# Patient Record
Sex: MALE | Race: BLACK OR AFRICAN AMERICAN | NOT HISPANIC OR LATINO | URBAN - METROPOLITAN AREA
[De-identification: names, ages, dates, MRNs, and addresses within clinical notes are randomized per-mention and may not be internally consistent; named-entity substitution may affect disease eponyms.]

---

## 2022-09-06 ENCOUNTER — INPATIENT (INPATIENT)
Facility: HOSPITAL | Age: 87
LOS: 10 days | Discharge: NURSING, REHAB OR LONG-TERM CARE | End: 2022-09-17
Attending: GENERAL ACUTE CARE HOSPITAL | Admitting: GENERAL ACUTE CARE HOSPITAL

## 2022-09-06 VITALS
SYSTOLIC BLOOD PRESSURE: 106 MMHG | HEART RATE: 79 BPM | HEIGHT: 71 IN | WEIGHT: 160.06 LBS | DIASTOLIC BLOOD PRESSURE: 75 MMHG | RESPIRATION RATE: 16 BRPM | TEMPERATURE: 98 F | OXYGEN SATURATION: 96 %

## 2022-09-06 DIAGNOSIS — N40.0 BENIGN PROSTATIC HYPERPLASIA WITHOUT LOWER URINARY TRACT SYMPTOMS: ICD-10-CM

## 2022-09-06 DIAGNOSIS — I10 ESSENTIAL (PRIMARY) HYPERTENSION: ICD-10-CM

## 2022-09-06 DIAGNOSIS — G30.9 ALZHEIMER'S DISEASE, UNSPECIFIED: ICD-10-CM

## 2022-09-06 DIAGNOSIS — E11.9 TYPE 2 DIABETES MELLITUS WITHOUT COMPLICATIONS: ICD-10-CM

## 2022-09-06 DIAGNOSIS — E78.5 HYPERLIPIDEMIA, UNSPECIFIED: ICD-10-CM

## 2022-09-06 DIAGNOSIS — Z29.9 ENCOUNTER FOR PROPHYLACTIC MEASURES, UNSPECIFIED: ICD-10-CM

## 2022-09-06 DIAGNOSIS — U07.1 COVID-19: ICD-10-CM

## 2022-09-06 DIAGNOSIS — N39.0 URINARY TRACT INFECTION, SITE NOT SPECIFIED: ICD-10-CM

## 2022-09-06 LAB
ALBUMIN SERPL ELPH-MCNC: 2.9 G/DL — LOW (ref 3.3–5)
ALP SERPL-CCNC: 63 U/L — SIGNIFICANT CHANGE UP (ref 40–120)
ALT FLD-CCNC: 32 U/L — SIGNIFICANT CHANGE UP (ref 12–78)
ANION GAP SERPL CALC-SCNC: 5 MMOL/L — SIGNIFICANT CHANGE UP (ref 5–17)
APPEARANCE UR: CLEAR — SIGNIFICANT CHANGE UP
AST SERPL-CCNC: 37 U/L — SIGNIFICANT CHANGE UP (ref 15–37)
BACTERIA # UR AUTO: ABNORMAL
BASOPHILS # BLD AUTO: 0.02 K/UL — SIGNIFICANT CHANGE UP (ref 0–0.2)
BASOPHILS NFR BLD AUTO: 0.4 % — SIGNIFICANT CHANGE UP (ref 0–2)
BILIRUB SERPL-MCNC: 0.3 MG/DL — SIGNIFICANT CHANGE UP (ref 0.2–1.2)
BILIRUB UR-MCNC: NEGATIVE — SIGNIFICANT CHANGE UP
BUN SERPL-MCNC: 27 MG/DL — HIGH (ref 7–23)
CALCIUM SERPL-MCNC: 9.4 MG/DL — SIGNIFICANT CHANGE UP (ref 8.5–10.1)
CHLORIDE SERPL-SCNC: 105 MMOL/L — SIGNIFICANT CHANGE UP (ref 96–108)
CO2 SERPL-SCNC: 28 MMOL/L — SIGNIFICANT CHANGE UP (ref 22–31)
COLOR SPEC: YELLOW — SIGNIFICANT CHANGE UP
CREAT SERPL-MCNC: 1.5 MG/DL — HIGH (ref 0.5–1.3)
DIFF PNL FLD: ABNORMAL
EGFR: 45 ML/MIN/1.73M2 — LOW
EOSINOPHIL # BLD AUTO: 0.03 K/UL — SIGNIFICANT CHANGE UP (ref 0–0.5)
EOSINOPHIL NFR BLD AUTO: 0.5 % — SIGNIFICANT CHANGE UP (ref 0–6)
EPI CELLS # UR: SIGNIFICANT CHANGE UP
GLUCOSE SERPL-MCNC: 131 MG/DL — HIGH (ref 70–99)
GLUCOSE UR QL: NEGATIVE MG/DL — SIGNIFICANT CHANGE UP
HCT VFR BLD CALC: 41.5 % — SIGNIFICANT CHANGE UP (ref 39–50)
HGB BLD-MCNC: 12.6 G/DL — LOW (ref 13–17)
IMM GRANULOCYTES NFR BLD AUTO: 0.4 % — SIGNIFICANT CHANGE UP (ref 0–1.5)
KETONES UR-MCNC: NEGATIVE — SIGNIFICANT CHANGE UP
LEUKOCYTE ESTERASE UR-ACNC: ABNORMAL
LYMPHOCYTES # BLD AUTO: 1.66 K/UL — SIGNIFICANT CHANGE UP (ref 1–3.3)
LYMPHOCYTES # BLD AUTO: 29.2 % — SIGNIFICANT CHANGE UP (ref 13–44)
MCHC RBC-ENTMCNC: 25.3 PG — LOW (ref 27–34)
MCHC RBC-ENTMCNC: 30.4 G/DL — LOW (ref 32–36)
MCV RBC AUTO: 83.2 FL — SIGNIFICANT CHANGE UP (ref 80–100)
MONOCYTES # BLD AUTO: 0.49 K/UL — SIGNIFICANT CHANGE UP (ref 0–0.9)
MONOCYTES NFR BLD AUTO: 8.6 % — SIGNIFICANT CHANGE UP (ref 2–14)
NEUTROPHILS # BLD AUTO: 3.47 K/UL — SIGNIFICANT CHANGE UP (ref 1.8–7.4)
NEUTROPHILS NFR BLD AUTO: 60.9 % — SIGNIFICANT CHANGE UP (ref 43–77)
NITRITE UR-MCNC: NEGATIVE — SIGNIFICANT CHANGE UP
NRBC # BLD: 0 /100 WBCS — SIGNIFICANT CHANGE UP (ref 0–0)
PH UR: 5 — SIGNIFICANT CHANGE UP (ref 5–8)
PLATELET # BLD AUTO: 240 K/UL — SIGNIFICANT CHANGE UP (ref 150–400)
POTASSIUM SERPL-MCNC: 5 MMOL/L — SIGNIFICANT CHANGE UP (ref 3.5–5.3)
POTASSIUM SERPL-SCNC: 5 MMOL/L — SIGNIFICANT CHANGE UP (ref 3.5–5.3)
PROT SERPL-MCNC: 8.6 GM/DL — HIGH (ref 6–8.3)
PROT UR-MCNC: 30 MG/DL
RAPID RVP RESULT: DETECTED
RBC # BLD: 4.99 M/UL — SIGNIFICANT CHANGE UP (ref 4.2–5.8)
RBC # FLD: 13.9 % — SIGNIFICANT CHANGE UP (ref 10.3–14.5)
RBC CASTS # UR COMP ASSIST: SIGNIFICANT CHANGE UP /HPF (ref 0–4)
SARS-COV-2 RNA SPEC QL NAA+PROBE: DETECTED
SODIUM SERPL-SCNC: 138 MMOL/L — SIGNIFICANT CHANGE UP (ref 135–145)
SP GR SPEC: 1.02 — SIGNIFICANT CHANGE UP (ref 1.01–1.02)
UROBILINOGEN FLD QL: NEGATIVE MG/DL — SIGNIFICANT CHANGE UP
WBC # BLD: 5.69 K/UL — SIGNIFICANT CHANGE UP (ref 3.8–10.5)
WBC # FLD AUTO: 5.69 K/UL — SIGNIFICANT CHANGE UP (ref 3.8–10.5)
WBC UR QL: ABNORMAL

## 2022-09-06 PROCEDURE — 70450 CT HEAD/BRAIN W/O DYE: CPT | Mod: 26,MA

## 2022-09-06 PROCEDURE — 99222 1ST HOSP IP/OBS MODERATE 55: CPT

## 2022-09-06 PROCEDURE — 99285 EMERGENCY DEPT VISIT HI MDM: CPT

## 2022-09-06 PROCEDURE — 71045 X-RAY EXAM CHEST 1 VIEW: CPT | Mod: 26

## 2022-09-06 PROCEDURE — 71250 CT THORAX DX C-: CPT | Mod: 26,MA

## 2022-09-06 RX ORDER — ACETAMINOPHEN 500 MG
650 TABLET ORAL EVERY 6 HOURS
Refills: 0 | Status: DISCONTINUED | OUTPATIENT
Start: 2022-09-06 | End: 2022-09-17

## 2022-09-06 RX ORDER — SODIUM CHLORIDE 9 MG/ML
1000 INJECTION, SOLUTION INTRAVENOUS
Refills: 0 | Status: DISCONTINUED | OUTPATIENT
Start: 2022-09-06 | End: 2022-09-17

## 2022-09-06 RX ORDER — TAMSULOSIN HYDROCHLORIDE 0.4 MG/1
0.4 CAPSULE ORAL AT BEDTIME
Refills: 0 | Status: DISCONTINUED | OUTPATIENT
Start: 2022-09-06 | End: 2022-09-17

## 2022-09-06 RX ORDER — DEXTROSE 50 % IN WATER 50 %
12.5 SYRINGE (ML) INTRAVENOUS ONCE
Refills: 0 | Status: DISCONTINUED | OUTPATIENT
Start: 2022-09-06 | End: 2022-09-17

## 2022-09-06 RX ORDER — DEXTROSE 50 % IN WATER 50 %
25 SYRINGE (ML) INTRAVENOUS ONCE
Refills: 0 | Status: DISCONTINUED | OUTPATIENT
Start: 2022-09-06 | End: 2022-09-17

## 2022-09-06 RX ORDER — SIMVASTATIN 20 MG/1
20 TABLET, FILM COATED ORAL AT BEDTIME
Refills: 0 | Status: DISCONTINUED | OUTPATIENT
Start: 2022-09-06 | End: 2022-09-17

## 2022-09-06 RX ORDER — INSULIN LISPRO 100/ML
VIAL (ML) SUBCUTANEOUS
Refills: 0 | Status: DISCONTINUED | OUTPATIENT
Start: 2022-09-06 | End: 2022-09-14

## 2022-09-06 RX ORDER — CEFTRIAXONE 500 MG/1
1000 INJECTION, POWDER, FOR SOLUTION INTRAMUSCULAR; INTRAVENOUS EVERY 24 HOURS
Refills: 0 | Status: DISCONTINUED | OUTPATIENT
Start: 2022-09-07 | End: 2022-09-09

## 2022-09-06 RX ORDER — CEFTRIAXONE 500 MG/1
1000 INJECTION, POWDER, FOR SOLUTION INTRAMUSCULAR; INTRAVENOUS ONCE
Refills: 0 | Status: COMPLETED | OUTPATIENT
Start: 2022-09-06 | End: 2022-09-06

## 2022-09-06 RX ORDER — DEXTROSE 50 % IN WATER 50 %
15 SYRINGE (ML) INTRAVENOUS ONCE
Refills: 0 | Status: DISCONTINUED | OUTPATIENT
Start: 2022-09-06 | End: 2022-09-17

## 2022-09-06 RX ORDER — DONEPEZIL HYDROCHLORIDE 10 MG/1
10 TABLET, FILM COATED ORAL AT BEDTIME
Refills: 0 | Status: DISCONTINUED | OUTPATIENT
Start: 2022-09-06 | End: 2022-09-17

## 2022-09-06 RX ORDER — GLUCAGON INJECTION, SOLUTION 0.5 MG/.1ML
1 INJECTION, SOLUTION SUBCUTANEOUS ONCE
Refills: 0 | Status: DISCONTINUED | OUTPATIENT
Start: 2022-09-06 | End: 2022-09-17

## 2022-09-06 RX ORDER — MEMANTINE HYDROCHLORIDE 10 MG/1
10 TABLET ORAL DAILY
Refills: 0 | Status: DISCONTINUED | OUTPATIENT
Start: 2022-09-06 | End: 2022-09-17

## 2022-09-06 RX ORDER — AMLODIPINE BESYLATE 2.5 MG/1
5 TABLET ORAL DAILY
Refills: 0 | Status: DISCONTINUED | OUTPATIENT
Start: 2022-09-06 | End: 2022-09-14

## 2022-09-06 NOTE — H&P ADULT - ASSESSMENT
Patient is an 87M with a PMH of dementia, HTN, DM, HLD, BPH who presents to the ED for AMS.  Patient currently AAOx0, unable to provide history.  Spoke to wife - Leon Rose - over the phone.  Per wife patient has had cough for the last 7 days, noted to have fever over the last two days.  At baseline, patient is confused and unable to communicate however is ambulatory with a walker.  For the last 4 days, patient has been unable to ambulate.  Wife concerned because patient's HHA is sick as well and she cannot take care of him at home alone.  Patient has received 3 COVID vaccinations (last in 12/21.)  Vitals stable, labs benign.  Will admit to med surg.

## 2022-09-06 NOTE — H&P ADULT - NSHPLABSRESULTS_GEN_ALL_CORE
Recent Vitals  T(C): 37 (22 @ 17:38), Max: 37 (22 @ 17:38)  HR: 66 (22 @ 17:38) (66 - 79)  BP: 119/66 (22 @ 17:38) (106/75 - 119/66)  RR: 19 (22 @ 17:38) (16 - 19)  SpO2: 96% (22 @ 17:38) (96% - 96%)                        12.6   5.69  )-----------( 240      ( 06 Sep 2022 18:00 )             41.5         138  |  105  |  27<H>  ----------------------------<  131<H>  5.0   |  28  |  1.50<H>    Ca    9.4      06 Sep 2022 18:00    TPro  8.6<H>  /  Alb  2.9<L>  /  TBili  0.3  /  DBili  x   /  AST  37  /  ALT  32  /  AlkPhos  63  09-      LIVER FUNCTIONS - ( 06 Sep 2022 18:00 )  Alb: 2.9 g/dL / Pro: 8.6 gm/dL / ALK PHOS: 63 U/L / ALT: 32 U/L / AST: 37 U/L / GGT: x           Urinalysis Basic - ( 06 Sep 2022 20:15 )    Color: Yellow / Appearance: Clear / S.020 / pH: x  Gluc: x / Ketone: Negative  / Bili: Negative / Urobili: Negative mg/dL   Blood: x / Protein: 30 mg/dL / Nitrite: Negative   Leuk Esterase: Trace / RBC: 0-2 /HPF / WBC 26-50   Sq Epi: x / Non Sq Epi: Few / Bacteria: Few        Home Medications:

## 2022-09-06 NOTE — ED ADULT NURSE REASSESSMENT NOTE - NS ED NURSE REASSESS COMMENT FT1
Received pt from previous RN in bed awake, not alert at baseline, spouse at bedside, pt noted to be saying unrelated words, cannot respond to question, noted to have his eyes closed and will not look at RN when asked to open eyes. when wife came, she admit that pt is incontinent x2, unable to verbalize needs. pt was straight cath for urine.

## 2022-09-06 NOTE — H&P ADULT - HISTORY OF PRESENT ILLNESS
Patient is an 87M with a PMH of dementia, HTN, DM, HLD, BPH who presents to the ED for AMS.  Patient currently AAOx0, unable to provide history.  Spoke to wife - Leon Rose - over the phone.  Per wife patient has had cough for the last 7 days, noted to have fever over the last two days.  At baseline, patient is confused and unable to communicate however is ambulatory with a walker.  For the last 4 days, patient has been unable to ambulate.  Wife concerned because patient's HHA is sick as well and she cannot take care of him at home alone.  Patient has received 3 COVID vaccinations (last in 12/21.)  Vitals stable, labs benign.  Will admit to med surg.        Wife - [phone]

## 2022-09-06 NOTE — H&P ADULT - NSHPPHYSICALEXAM_GEN_ALL_CORE
Physical exam:  General: elderly male in no acute distress, resting comfortably  Head:  Atraumatic, Normocephalic  Eyes: EOMI, PERRLA, clear sclera  Neck: Supple, thyroid nontender, non enlarged  Cardio: S1/S2 +ve, regular rate and rhythm, no M/G/R  Resp: clear to ausculation bilaterally, no rales or wheezes  GI: abdomen soft, nontender, non distended, no guarding, BS +ve x 4  Ext: no significant pedal edema  Neuro: CN 2-12 intact, no significant motor or sensory deficits.  Skin: No rashes or lesions

## 2022-09-06 NOTE — ED ADULT NURSE NOTE - OBJECTIVE STATEMENT
pt is 88 y/o male BIBA for weakness and cough as per spouse, BIBA. pts spouse providing history. pt nonverbal AAOX0 at baseline. pt spouse Leon reports pt non-ambulatory x4 days. hx of dementia, Alzheimers, HTN, borderline diabetes, HDL.  fall with harm precautions maintained.

## 2022-09-06 NOTE — ED PROVIDER NOTE - PHYSICAL EXAMINATION
Gen: +Lethargic. Responsive to pain and verbal stimuli. NAD. Afebrile.   Head: NC, AT   Eyes: PERRL, EOMI, normal lids/conjunctiva  ENT: normal hearing, patent oropharynx without erythema/exudate, uvula midline  Neck: supple, no tenderness, Trachea midline  Pulm: +Bronchi throughout b/l  CV: RRR, no M/R/G, 2+ radial and dp pulses bl, no edema  Abd: soft, NT/ND, +BS, no hepatosplenomegaly  Mskel: extremities x4 with normal ROM and no joint effusions. no ctl spine ttp. No peripheral edema.  Skin: no rash, no bruising   Neuro: +unable to complete neuro exam due to pt mental status but wife states pt is non verbal at baseline. Gen: +Lethargic. Responsive to pain and verbal stimuli. NAD. Afebrile.   Head: NC, AT   Eyes: PERRL, EOMI, normal lids/conjunctiva  ENT: normal hearing, patent oropharynx without erythema/exudate, uvula midline  Neck: supple, no tenderness, Trachea midline  Pulm: normal resp effort. rhonchi bilateral   CV: RRR, no M/R/G, 2+ radial and dp pulses bl, no edema  Abd: soft, NT/ND, +BS,   Mskel: extremities x4 with normal ROM and no joint effusions. No peripheral edema.  Skin: no rash, no bruising   Neuro:  responsive to stimuli, unable to complete exam due to mental status

## 2022-09-06 NOTE — H&P ADULT - NSHPREVIEWOFSYSTEMS_GEN_ALL_CORE
Constitutional: fever positive.  No chills, night sweats  Ears: no hearing changes or ear pain,   Nose: no nasal congestion, sinus pain, or rhinorrhea  Cardio: no chest pain, orthopnea, edema, or palpitations  Resp: cough positive.  No dyspnea, wheezing  GI: no nausea, vomiting, diarrhea, constipation, hematochezia, or melena  : no dysuria, urinary frequency, hematuria  MSK: no back pain, neck pain  Skin: no rash, pruritis   Neuro: weakness positive.  No dizziness, lightheadedness, syncope   Heme/Lymph: no bruising or bleeding

## 2022-09-06 NOTE — H&P ADULT - PROBLEM SELECTOR PLAN 1
COVID 19 swabbed in ED - +ve results.  Isolation precautions  Tylenol PRN fever  Will hold dexamethasone and remdesivir as patient is not requiring supplemental oxygen

## 2022-09-06 NOTE — ED PROVIDER NOTE - OBJECTIVE STATEMENT
87 year old male w/PMH of Dementia, HTN, HLD, presents to the ED c/o cough for the past x4 days w/ generalized weakness. As per wife, home attendant was sick last week and the pt caught a cold from them. Wife reports pt's cough is worse at night. No SOB. Wife states over the weekend, pt had a fever but has not been febrile since. Pt came in to the ED due to his inability to ambulate. Pt is nonverbal at baseline, more sleepy. Normal appetite. Wife states pt has home care during the day but the wife isn't able to take care of him at night.

## 2022-09-06 NOTE — ED ADULT NURSE NOTE - NSICDXPASTMEDICALHX_GEN_ALL_CORE_FT
PAST MEDICAL HISTORY:  Alzheimers disease     Dementia     Diabetes     High cholesterol     Hypertension

## 2022-09-06 NOTE — ED PROVIDER NOTE - ATTENDING APP SHARED VISIT CONTRIBUTION OF CARE
HPI:  87 year old male w/PMH of Dementia, HTN, HLD, presents to the ED c/o cough for the past x4 days w/ generalized weakness. As per wife, home attendant was sick last week and the pt caught a cold from them. Wife reports pt's cough is worse at night. No SOB. Wife states over the weekend, pt had a fever but has not been febrile since. Pt came in to the ED due to his inability to ambulate. Pt is nonverbal at baseline, more sleepy. Normal appetite. Wife states pt has home care during the day but the wife isn't able to take care of him at night.    ROS  neg  as per HPI    PE:  GENERAL: tired,  rouses to stimuli  HEENT: NC/AT, moist mucous membranes  LUNGS: CTAB, no wheezes or crackles   CARDIAC: RRR, no m/r/g  ABDOMEN: soft  non tender, non distended, no rebound, no guarding  EXT: No edema, no calf tenderness, 2+ DP pulses bilaterally, no deformities.  NEURO: Minimally interactive. Rouses  to voice. Moving all extremities.  SKIN: Warm and dry. No rash.  PSYCH: appears  at baseline    MDM:  87 year old male w/PMH of Dementia, HTN, HLD, presents to the ED c/o cough for the past x4 days w/ generalized weakness. Not  septic  but  likely infectious source  covid  vs pna attributing to sx.  Plan for labs, cxr, admission.

## 2022-09-06 NOTE — ED ADULT NURSE NOTE - CHIEF COMPLAINT QUOTE
BIBA as per EMS patient having general malaise and cough x 1 week. unable to ambulate x 4 days. Pt alert, disoriented, baseline per family member.   Hx: Alzheimer, Dementia, HTN, DM, HL

## 2022-09-06 NOTE — ED PROVIDER NOTE - NS ED ATTENDING STATEMENT MOD
This was a shared visit with the THAIS. I reviewed and verified the documentation and independently performed the documented:

## 2022-09-07 LAB
A1C WITH ESTIMATED AVERAGE GLUCOSE RESULT: 6.7 % — HIGH (ref 4–5.6)
ANION GAP SERPL CALC-SCNC: 6 MMOL/L — SIGNIFICANT CHANGE UP (ref 5–17)
BUN SERPL-MCNC: 26 MG/DL — HIGH (ref 7–23)
CALCIUM SERPL-MCNC: 9.2 MG/DL — SIGNIFICANT CHANGE UP (ref 8.5–10.1)
CHLORIDE SERPL-SCNC: 106 MMOL/L — SIGNIFICANT CHANGE UP (ref 96–108)
CO2 SERPL-SCNC: 26 MMOL/L — SIGNIFICANT CHANGE UP (ref 22–31)
CREAT SERPL-MCNC: 1.46 MG/DL — HIGH (ref 0.5–1.3)
D DIMER BLD IA.RAPID-MCNC: 1156 NG/ML DDU — HIGH
EGFR: 47 ML/MIN/1.73M2 — LOW
ESTIMATED AVERAGE GLUCOSE: 146 MG/DL — HIGH (ref 68–114)
GLUCOSE BLDC GLUCOMTR-MCNC: 102 MG/DL — HIGH (ref 70–99)
GLUCOSE BLDC GLUCOMTR-MCNC: 109 MG/DL — HIGH (ref 70–99)
GLUCOSE BLDC GLUCOMTR-MCNC: 138 MG/DL — HIGH (ref 70–99)
GLUCOSE BLDC GLUCOMTR-MCNC: 98 MG/DL — SIGNIFICANT CHANGE UP (ref 70–99)
GLUCOSE SERPL-MCNC: 101 MG/DL — HIGH (ref 70–99)
POTASSIUM SERPL-MCNC: 4.6 MMOL/L — SIGNIFICANT CHANGE UP (ref 3.5–5.3)
POTASSIUM SERPL-SCNC: 4.6 MMOL/L — SIGNIFICANT CHANGE UP (ref 3.5–5.3)
SODIUM SERPL-SCNC: 138 MMOL/L — SIGNIFICANT CHANGE UP (ref 135–145)

## 2022-09-07 PROCEDURE — 99232 SBSQ HOSP IP/OBS MODERATE 35: CPT

## 2022-09-07 RX ORDER — ENOXAPARIN SODIUM 100 MG/ML
40 INJECTION SUBCUTANEOUS EVERY 12 HOURS
Refills: 0 | Status: DISCONTINUED | OUTPATIENT
Start: 2022-09-07 | End: 2022-09-07

## 2022-09-07 RX ORDER — ENOXAPARIN SODIUM 100 MG/ML
40 INJECTION SUBCUTANEOUS EVERY 24 HOURS
Refills: 0 | Status: DISCONTINUED | OUTPATIENT
Start: 2022-09-07 | End: 2022-09-17

## 2022-09-07 RX ADMIN — CEFTRIAXONE 100 MILLIGRAM(S): 500 INJECTION, POWDER, FOR SOLUTION INTRAMUSCULAR; INTRAVENOUS at 00:18

## 2022-09-07 RX ADMIN — MEMANTINE HYDROCHLORIDE 10 MILLIGRAM(S): 10 TABLET ORAL at 12:03

## 2022-09-07 RX ADMIN — CEFTRIAXONE 100 MILLIGRAM(S): 500 INJECTION, POWDER, FOR SOLUTION INTRAMUSCULAR; INTRAVENOUS at 23:21

## 2022-09-07 RX ADMIN — SIMVASTATIN 20 MILLIGRAM(S): 20 TABLET, FILM COATED ORAL at 22:51

## 2022-09-07 RX ADMIN — AMLODIPINE BESYLATE 5 MILLIGRAM(S): 2.5 TABLET ORAL at 05:26

## 2022-09-07 RX ADMIN — TAMSULOSIN HYDROCHLORIDE 0.4 MILLIGRAM(S): 0.4 CAPSULE ORAL at 22:51

## 2022-09-07 RX ADMIN — ENOXAPARIN SODIUM 40 MILLIGRAM(S): 100 INJECTION SUBCUTANEOUS at 15:04

## 2022-09-07 RX ADMIN — DONEPEZIL HYDROCHLORIDE 10 MILLIGRAM(S): 10 TABLET, FILM COATED ORAL at 22:51

## 2022-09-07 NOTE — ED ADULT NURSE REASSESSMENT NOTE - NS ED NURSE REASSESS COMMENT FT1
report given to ASHA Wang pt straight cath for urine sample needed by lab, RN Notified. pt being transferred to the unit.

## 2022-09-07 NOTE — PATIENT PROFILE ADULT - FALL HARM RISK - HARM RISK INTERVENTIONS

## 2022-09-08 ENCOUNTER — TRANSCRIPTION ENCOUNTER (OUTPATIENT)
Age: 87
End: 2022-09-08

## 2022-09-08 LAB
GLUCOSE BLDC GLUCOMTR-MCNC: 104 MG/DL — HIGH (ref 70–99)
GLUCOSE BLDC GLUCOMTR-MCNC: 132 MG/DL — HIGH (ref 70–99)
GLUCOSE BLDC GLUCOMTR-MCNC: 134 MG/DL — HIGH (ref 70–99)
GLUCOSE BLDC GLUCOMTR-MCNC: 138 MG/DL — HIGH (ref 70–99)

## 2022-09-08 PROCEDURE — 99232 SBSQ HOSP IP/OBS MODERATE 35: CPT

## 2022-09-08 RX ADMIN — SIMVASTATIN 20 MILLIGRAM(S): 20 TABLET, FILM COATED ORAL at 22:40

## 2022-09-08 RX ADMIN — TAMSULOSIN HYDROCHLORIDE 0.4 MILLIGRAM(S): 0.4 CAPSULE ORAL at 22:40

## 2022-09-08 RX ADMIN — AMLODIPINE BESYLATE 5 MILLIGRAM(S): 2.5 TABLET ORAL at 06:19

## 2022-09-08 RX ADMIN — ENOXAPARIN SODIUM 40 MILLIGRAM(S): 100 INJECTION SUBCUTANEOUS at 13:38

## 2022-09-08 RX ADMIN — DONEPEZIL HYDROCHLORIDE 10 MILLIGRAM(S): 10 TABLET, FILM COATED ORAL at 22:40

## 2022-09-08 RX ADMIN — CEFTRIAXONE 100 MILLIGRAM(S): 500 INJECTION, POWDER, FOR SOLUTION INTRAMUSCULAR; INTRAVENOUS at 23:28

## 2022-09-08 RX ADMIN — MEMANTINE HYDROCHLORIDE 10 MILLIGRAM(S): 10 TABLET ORAL at 11:19

## 2022-09-08 NOTE — PHYSICAL THERAPY INITIAL EVALUATION ADULT - PERTINENT HX OF CURRENT PROBLEM, REHAB EVAL
Patient is an 87M with a PMH of dementia, HTN, DM, HLD, BPH who presents to the ED for AMS.  Patient currently AAOx0, unable to provide history.  Spoke to wife - Leon Rose - over the phone.  Per wife patient has had cough for the last 7 days, noted to have fever over the last two days.  At baseline, patient is confused and unable to communicate however is ambulatory with a walker.  For the last 4 days, patient has been unable to ambulate.  Wife concerned because patient's HHA is sick as well and she cannot take care of him at home alone.  Patient has received 3 COVID vaccinations (last in 12/21.)  Vitals stable, labs benign.

## 2022-09-08 NOTE — PHYSICAL THERAPY INITIAL EVALUATION ADULT - RANGE OF MOTION EXAMINATION, REHAB EVAL
PROM/bilateral upper extremity ROM was WFL (within functional limits)/bilateral lower extremity ROM was WFL (within functional limits)

## 2022-09-08 NOTE — PHYSICAL THERAPY INITIAL EVALUATION ADULT - BED MOBILITY TRAINING, PT EVAL
CGAx1 in bed mobility- supine<>sit, rolling side<>side observing proper body mechanics, proper positioning, body alignment and precautions.

## 2022-09-08 NOTE — PHYSICAL THERAPY INITIAL EVALUATION ADULT - ADDITIONAL COMMENTS
Pt is a poor historian however from chart review pt was able to ambulate with walker. Pt lives with wife in an apartment with an elevator. No steps in the back entrance.

## 2022-09-09 LAB
-  AMPICILLIN: SIGNIFICANT CHANGE UP
-  CIPROFLOXACIN: SIGNIFICANT CHANGE UP
-  LEVOFLOXACIN: SIGNIFICANT CHANGE UP
-  NITROFURANTOIN: SIGNIFICANT CHANGE UP
-  TETRACYCLINE: SIGNIFICANT CHANGE UP
-  VANCOMYCIN: SIGNIFICANT CHANGE UP
CULTURE RESULTS: SIGNIFICANT CHANGE UP
GLUCOSE BLDC GLUCOMTR-MCNC: 112 MG/DL — HIGH (ref 70–99)
GLUCOSE BLDC GLUCOMTR-MCNC: 119 MG/DL — HIGH (ref 70–99)
GLUCOSE BLDC GLUCOMTR-MCNC: 125 MG/DL — HIGH (ref 70–99)
GLUCOSE BLDC GLUCOMTR-MCNC: 142 MG/DL — HIGH (ref 70–99)
METHOD TYPE: SIGNIFICANT CHANGE UP
ORGANISM # SPEC MICROSCOPIC CNT: SIGNIFICANT CHANGE UP
ORGANISM # SPEC MICROSCOPIC CNT: SIGNIFICANT CHANGE UP
SPECIMEN SOURCE: SIGNIFICANT CHANGE UP

## 2022-09-09 PROCEDURE — 99232 SBSQ HOSP IP/OBS MODERATE 35: CPT

## 2022-09-09 RX ORDER — PIPERACILLIN AND TAZOBACTAM 4; .5 G/20ML; G/20ML
3.38 INJECTION, POWDER, LYOPHILIZED, FOR SOLUTION INTRAVENOUS ONCE
Refills: 0 | Status: DISCONTINUED | OUTPATIENT
Start: 2022-09-09 | End: 2022-09-09

## 2022-09-09 RX ORDER — MEMANTINE HYDROCHLORIDE 10 MG/1
1 TABLET ORAL
Qty: 0 | Refills: 0 | DISCHARGE

## 2022-09-09 RX ORDER — PIPERACILLIN AND TAZOBACTAM 4; .5 G/20ML; G/20ML
3.38 INJECTION, POWDER, LYOPHILIZED, FOR SOLUTION INTRAVENOUS EVERY 8 HOURS
Refills: 0 | Status: DISCONTINUED | OUTPATIENT
Start: 2022-09-09 | End: 2022-09-09

## 2022-09-09 RX ADMIN — SIMVASTATIN 20 MILLIGRAM(S): 20 TABLET, FILM COATED ORAL at 21:40

## 2022-09-09 RX ADMIN — TAMSULOSIN HYDROCHLORIDE 0.4 MILLIGRAM(S): 0.4 CAPSULE ORAL at 21:40

## 2022-09-09 RX ADMIN — AMLODIPINE BESYLATE 5 MILLIGRAM(S): 2.5 TABLET ORAL at 06:16

## 2022-09-09 RX ADMIN — Medication 1 TABLET(S): at 17:13

## 2022-09-09 RX ADMIN — DONEPEZIL HYDROCHLORIDE 10 MILLIGRAM(S): 10 TABLET, FILM COATED ORAL at 21:38

## 2022-09-09 RX ADMIN — ENOXAPARIN SODIUM 40 MILLIGRAM(S): 100 INJECTION SUBCUTANEOUS at 13:50

## 2022-09-09 RX ADMIN — MEMANTINE HYDROCHLORIDE 10 MILLIGRAM(S): 10 TABLET ORAL at 12:33

## 2022-09-10 LAB
ANION GAP SERPL CALC-SCNC: 8 MMOL/L — SIGNIFICANT CHANGE UP (ref 5–17)
BUN SERPL-MCNC: 32 MG/DL — HIGH (ref 7–23)
CALCIUM SERPL-MCNC: 9.8 MG/DL — SIGNIFICANT CHANGE UP (ref 8.5–10.1)
CHLORIDE SERPL-SCNC: 108 MMOL/L — SIGNIFICANT CHANGE UP (ref 96–108)
CO2 SERPL-SCNC: 23 MMOL/L — SIGNIFICANT CHANGE UP (ref 22–31)
CREAT SERPL-MCNC: 1.54 MG/DL — HIGH (ref 0.5–1.3)
EGFR: 44 ML/MIN/1.73M2 — LOW
GLUCOSE BLDC GLUCOMTR-MCNC: 106 MG/DL — HIGH (ref 70–99)
GLUCOSE BLDC GLUCOMTR-MCNC: 107 MG/DL — HIGH (ref 70–99)
GLUCOSE BLDC GLUCOMTR-MCNC: 145 MG/DL — HIGH (ref 70–99)
GLUCOSE BLDC GLUCOMTR-MCNC: 234 MG/DL — HIGH (ref 70–99)
GLUCOSE SERPL-MCNC: 118 MG/DL — HIGH (ref 70–99)
HCT VFR BLD CALC: 45 % — SIGNIFICANT CHANGE UP (ref 39–50)
HGB BLD-MCNC: 14 G/DL — SIGNIFICANT CHANGE UP (ref 13–17)
MCHC RBC-ENTMCNC: 25.5 PG — LOW (ref 27–34)
MCHC RBC-ENTMCNC: 31.1 G/DL — LOW (ref 32–36)
MCV RBC AUTO: 82 FL — SIGNIFICANT CHANGE UP (ref 80–100)
NRBC # BLD: 0 /100 WBCS — SIGNIFICANT CHANGE UP (ref 0–0)
PLATELET # BLD AUTO: 280 K/UL — SIGNIFICANT CHANGE UP (ref 150–400)
POTASSIUM SERPL-MCNC: 4.5 MMOL/L — SIGNIFICANT CHANGE UP (ref 3.5–5.3)
POTASSIUM SERPL-SCNC: 4.5 MMOL/L — SIGNIFICANT CHANGE UP (ref 3.5–5.3)
RBC # BLD: 5.49 M/UL — SIGNIFICANT CHANGE UP (ref 4.2–5.8)
RBC # FLD: 13.9 % — SIGNIFICANT CHANGE UP (ref 10.3–14.5)
SODIUM SERPL-SCNC: 139 MMOL/L — SIGNIFICANT CHANGE UP (ref 135–145)
WBC # BLD: 6.76 K/UL — SIGNIFICANT CHANGE UP (ref 3.8–10.5)
WBC # FLD AUTO: 6.76 K/UL — SIGNIFICANT CHANGE UP (ref 3.8–10.5)

## 2022-09-10 PROCEDURE — 99232 SBSQ HOSP IP/OBS MODERATE 35: CPT

## 2022-09-10 RX ORDER — SODIUM CHLORIDE 9 MG/ML
1000 INJECTION, SOLUTION INTRAVENOUS
Refills: 0 | Status: COMPLETED | OUTPATIENT
Start: 2022-09-10 | End: 2022-09-10

## 2022-09-10 RX ADMIN — SODIUM CHLORIDE 75 MILLILITER(S): 9 INJECTION, SOLUTION INTRAVENOUS at 11:35

## 2022-09-10 RX ADMIN — Medication 1 TABLET(S): at 17:06

## 2022-09-10 RX ADMIN — Medication 2: at 16:33

## 2022-09-10 RX ADMIN — Medication 1 TABLET(S): at 05:58

## 2022-09-10 RX ADMIN — MEMANTINE HYDROCHLORIDE 10 MILLIGRAM(S): 10 TABLET ORAL at 12:35

## 2022-09-10 RX ADMIN — AMLODIPINE BESYLATE 5 MILLIGRAM(S): 2.5 TABLET ORAL at 05:56

## 2022-09-10 RX ADMIN — DONEPEZIL HYDROCHLORIDE 10 MILLIGRAM(S): 10 TABLET, FILM COATED ORAL at 22:18

## 2022-09-10 RX ADMIN — SODIUM CHLORIDE 75 MILLILITER(S): 9 INJECTION, SOLUTION INTRAVENOUS at 22:19

## 2022-09-10 RX ADMIN — ENOXAPARIN SODIUM 40 MILLIGRAM(S): 100 INJECTION SUBCUTANEOUS at 13:45

## 2022-09-10 RX ADMIN — SIMVASTATIN 20 MILLIGRAM(S): 20 TABLET, FILM COATED ORAL at 22:18

## 2022-09-11 LAB
GLUCOSE BLDC GLUCOMTR-MCNC: 108 MG/DL — HIGH (ref 70–99)
GLUCOSE BLDC GLUCOMTR-MCNC: 121 MG/DL — HIGH (ref 70–99)
GLUCOSE BLDC GLUCOMTR-MCNC: 125 MG/DL — HIGH (ref 70–99)
GLUCOSE BLDC GLUCOMTR-MCNC: 138 MG/DL — HIGH (ref 70–99)

## 2022-09-11 PROCEDURE — 99232 SBSQ HOSP IP/OBS MODERATE 35: CPT

## 2022-09-11 RX ADMIN — AMLODIPINE BESYLATE 5 MILLIGRAM(S): 2.5 TABLET ORAL at 06:11

## 2022-09-11 RX ADMIN — Medication 1 TABLET(S): at 17:21

## 2022-09-11 RX ADMIN — Medication 1 TABLET(S): at 06:10

## 2022-09-11 RX ADMIN — ENOXAPARIN SODIUM 40 MILLIGRAM(S): 100 INJECTION SUBCUTANEOUS at 13:10

## 2022-09-11 RX ADMIN — MEMANTINE HYDROCHLORIDE 10 MILLIGRAM(S): 10 TABLET ORAL at 12:28

## 2022-09-12 LAB
CULTURE RESULTS: SIGNIFICANT CHANGE UP
CULTURE RESULTS: SIGNIFICANT CHANGE UP
FLUAV AG NPH QL: SIGNIFICANT CHANGE UP
FLUBV AG NPH QL: SIGNIFICANT CHANGE UP
GLUCOSE BLDC GLUCOMTR-MCNC: 105 MG/DL — HIGH (ref 70–99)
GLUCOSE BLDC GLUCOMTR-MCNC: 111 MG/DL — HIGH (ref 70–99)
GLUCOSE BLDC GLUCOMTR-MCNC: 120 MG/DL — HIGH (ref 70–99)
GLUCOSE BLDC GLUCOMTR-MCNC: 96 MG/DL — SIGNIFICANT CHANGE UP (ref 70–99)
SARS-COV-2 RNA SPEC QL NAA+PROBE: DETECTED
SPECIMEN SOURCE: SIGNIFICANT CHANGE UP
SPECIMEN SOURCE: SIGNIFICANT CHANGE UP

## 2022-09-12 PROCEDURE — 99232 SBSQ HOSP IP/OBS MODERATE 35: CPT

## 2022-09-12 RX ADMIN — DONEPEZIL HYDROCHLORIDE 10 MILLIGRAM(S): 10 TABLET, FILM COATED ORAL at 21:59

## 2022-09-12 RX ADMIN — MEMANTINE HYDROCHLORIDE 10 MILLIGRAM(S): 10 TABLET ORAL at 12:39

## 2022-09-12 RX ADMIN — Medication 1 TABLET(S): at 17:31

## 2022-09-12 RX ADMIN — TAMSULOSIN HYDROCHLORIDE 0.4 MILLIGRAM(S): 0.4 CAPSULE ORAL at 21:57

## 2022-09-12 RX ADMIN — ENOXAPARIN SODIUM 40 MILLIGRAM(S): 100 INJECTION SUBCUTANEOUS at 14:41

## 2022-09-12 RX ADMIN — SIMVASTATIN 20 MILLIGRAM(S): 20 TABLET, FILM COATED ORAL at 21:59

## 2022-09-13 LAB
GLUCOSE BLDC GLUCOMTR-MCNC: 100 MG/DL — HIGH (ref 70–99)
GLUCOSE BLDC GLUCOMTR-MCNC: 84 MG/DL — SIGNIFICANT CHANGE UP (ref 70–99)
GLUCOSE BLDC GLUCOMTR-MCNC: 87 MG/DL — SIGNIFICANT CHANGE UP (ref 70–99)
GLUCOSE BLDC GLUCOMTR-MCNC: 98 MG/DL — SIGNIFICANT CHANGE UP (ref 70–99)

## 2022-09-13 PROCEDURE — 99232 SBSQ HOSP IP/OBS MODERATE 35: CPT

## 2022-09-13 RX ADMIN — TAMSULOSIN HYDROCHLORIDE 0.4 MILLIGRAM(S): 0.4 CAPSULE ORAL at 21:10

## 2022-09-13 RX ADMIN — AMLODIPINE BESYLATE 5 MILLIGRAM(S): 2.5 TABLET ORAL at 05:39

## 2022-09-13 RX ADMIN — MEMANTINE HYDROCHLORIDE 10 MILLIGRAM(S): 10 TABLET ORAL at 12:27

## 2022-09-13 RX ADMIN — DONEPEZIL HYDROCHLORIDE 10 MILLIGRAM(S): 10 TABLET, FILM COATED ORAL at 21:09

## 2022-09-13 RX ADMIN — Medication 1 TABLET(S): at 05:39

## 2022-09-13 RX ADMIN — Medication 1 TABLET(S): at 17:08

## 2022-09-13 RX ADMIN — ENOXAPARIN SODIUM 40 MILLIGRAM(S): 100 INJECTION SUBCUTANEOUS at 14:37

## 2022-09-13 RX ADMIN — SIMVASTATIN 20 MILLIGRAM(S): 20 TABLET, FILM COATED ORAL at 21:09

## 2022-09-13 NOTE — DIETITIAN INITIAL EVALUATION ADULT - NS FNS DIET ORDER
Diet, Pureed:   Consistent Carbohydrate {No Snacks}  Supplement Feeding Modality:  Oral  Glucerna Shake Cans or Servings Per Day:  1       Frequency:  Daily (09-10-22 @ 10:13)

## 2022-09-13 NOTE — DIETITIAN INITIAL EVALUATION ADULT - OTHER CALCULATIONS
Ht (cm): 180.3  Wt (kg): 67.8 (9/13)  BMI: 20.8    IBW: 78.2 kg     %IBW: 87%      UBW:  unknown     %UBW: unknown

## 2022-09-13 NOTE — DIETITIAN INITIAL EVALUATION ADULT - PERSON TAUGHT/METHOD
advised wife to concentrate on protein and nutritional supplements if pt continues with limited appetite/verbal instruction/spouse instructed

## 2022-09-13 NOTE — DIETITIAN INITIAL EVALUATION ADULT - PERTINENT LABORATORY DATA
POCT Blood Glucose.: 87 mg/dL (09-13-22 @ 11:19); 96, 105, 120, 111  A1C with Estimated Average Glucose Result: 6.7 %, 146 (09-07-22 @ 06:55)

## 2022-09-13 NOTE — DIETITIAN INITIAL EVALUATION ADULT - OTHER INFO
Unable to interview pt due to cognitive impairment; spoke to wife Leon ([phone]). Wife reports pt on soft cut up consistency at home; requires total assistance and encouragement; pt sometimes falls asleep during assisted feeding; agrees with puree consistency while in the hospital. Pt takes Prandin x2/day to control BG levels at home. Reports wt loss but unable to provide specific information. Pt has HHA at home to assist with ADLs. No reports of any N/V/D/C. Pt consumes Glucerna at home; will add to diet order.

## 2022-09-13 NOTE — DIETITIAN INITIAL EVALUATION ADULT - PHYSCIAL ASSESSMENT
Able to observe pt through door due to isolation precautions being maintained for COVID; observed pt with severe muscle wasting bilateral calves and thighs/underweight/emaciated

## 2022-09-13 NOTE — DIETITIAN NUTRITION RISK NOTIFICATION - TREATMENT: THE FOLLOWING DIET HAS BEEN RECOMMENDED
Diet, Pureed:   Consistent Carbohydrate {No Snacks}  Supplement Feeding Modality:  Oral  Glucerna Shake Cans or Servings Per Day:  1       Frequency:  Three Times a day (09-13-22 @ 12:17) [Pending Verification By Attending]  Diet, Pureed:   Consistent Carbohydrate {No Snacks}  Supplement Feeding Modality:  Oral  Glucerna Shake Cans or Servings Per Day:  1       Frequency:  Daily (09-10-22 @ 10:13) [Active]

## 2022-09-13 NOTE — PROGRESS NOTE ADULT - NSPROGADDITIONALINFOA_GEN_ALL_CORE
pt consult rec niki and family is agreeable. awaiting placement
pt consult rec niki and family is agreeable
pt consult rec niki and family is agreeable. awaiting placement
pt consult pending
pt consult
pt consult rec niki and family is agreeable. awaiting placement to [facility] and may need 10 days ( FRiday )   stable for dc
pt consult rec niki and family is agreeable

## 2022-09-14 LAB
GLUCOSE BLDC GLUCOMTR-MCNC: 100 MG/DL — HIGH (ref 70–99)
GLUCOSE BLDC GLUCOMTR-MCNC: 161 MG/DL — HIGH (ref 70–99)
GLUCOSE BLDC GLUCOMTR-MCNC: 177 MG/DL — HIGH (ref 70–99)
GLUCOSE BLDC GLUCOMTR-MCNC: 215 MG/DL — HIGH (ref 70–99)

## 2022-09-14 PROCEDURE — 99232 SBSQ HOSP IP/OBS MODERATE 35: CPT

## 2022-09-14 RX ORDER — SODIUM CHLORIDE 9 MG/ML
500 INJECTION INTRAMUSCULAR; INTRAVENOUS; SUBCUTANEOUS ONCE
Refills: 0 | Status: COMPLETED | OUTPATIENT
Start: 2022-09-14 | End: 2022-09-14

## 2022-09-14 RX ORDER — SODIUM CHLORIDE 9 MG/ML
1000 INJECTION, SOLUTION INTRAVENOUS ONCE
Refills: 0 | Status: COMPLETED | OUTPATIENT
Start: 2022-09-14 | End: 2022-09-14

## 2022-09-14 RX ORDER — LACTOBACILLUS ACIDOPHILUS 100MM CELL
1 CAPSULE ORAL DAILY
Refills: 0 | Status: DISCONTINUED | OUTPATIENT
Start: 2022-09-14 | End: 2022-09-17

## 2022-09-14 RX ADMIN — ENOXAPARIN SODIUM 40 MILLIGRAM(S): 100 INJECTION SUBCUTANEOUS at 12:55

## 2022-09-14 RX ADMIN — SODIUM CHLORIDE 1000 MILLILITER(S): 9 INJECTION, SOLUTION INTRAVENOUS at 16:32

## 2022-09-14 RX ADMIN — MEMANTINE HYDROCHLORIDE 10 MILLIGRAM(S): 10 TABLET ORAL at 12:55

## 2022-09-14 RX ADMIN — SODIUM CHLORIDE 1000 MILLILITER(S): 9 INJECTION, SOLUTION INTRAVENOUS at 17:41

## 2022-09-14 RX ADMIN — SODIUM CHLORIDE 500 MILLILITER(S): 9 INJECTION INTRAMUSCULAR; INTRAVENOUS; SUBCUTANEOUS at 14:27

## 2022-09-14 RX ADMIN — SIMVASTATIN 20 MILLIGRAM(S): 20 TABLET, FILM COATED ORAL at 21:28

## 2022-09-14 RX ADMIN — TAMSULOSIN HYDROCHLORIDE 0.4 MILLIGRAM(S): 0.4 CAPSULE ORAL at 21:27

## 2022-09-14 RX ADMIN — DONEPEZIL HYDROCHLORIDE 10 MILLIGRAM(S): 10 TABLET, FILM COATED ORAL at 21:27

## 2022-09-14 RX ADMIN — Medication 1 TABLET(S): at 05:11

## 2022-09-15 LAB
ANION GAP SERPL CALC-SCNC: 9 MMOL/L — SIGNIFICANT CHANGE UP (ref 5–17)
BUN SERPL-MCNC: 26 MG/DL — HIGH (ref 7–23)
CALCIUM SERPL-MCNC: 9.6 MG/DL — SIGNIFICANT CHANGE UP (ref 8.5–10.1)
CHLORIDE SERPL-SCNC: 110 MMOL/L — HIGH (ref 96–108)
CO2 SERPL-SCNC: 20 MMOL/L — LOW (ref 22–31)
CREAT SERPL-MCNC: 1.48 MG/DL — HIGH (ref 0.5–1.3)
D DIMER BLD IA.RAPID-MCNC: 967 NG/ML DDU — HIGH
EGFR: 46 ML/MIN/1.73M2 — LOW
FLUAV AG NPH QL: SIGNIFICANT CHANGE UP
FLUBV AG NPH QL: SIGNIFICANT CHANGE UP
GLUCOSE BLDC GLUCOMTR-MCNC: 144 MG/DL — HIGH (ref 70–99)
GLUCOSE BLDC GLUCOMTR-MCNC: 145 MG/DL — HIGH (ref 70–99)
GLUCOSE BLDC GLUCOMTR-MCNC: 156 MG/DL — HIGH (ref 70–99)
GLUCOSE BLDC GLUCOMTR-MCNC: 183 MG/DL — HIGH (ref 70–99)
GLUCOSE SERPL-MCNC: 120 MG/DL — HIGH (ref 70–99)
HCT VFR BLD CALC: 41.4 % — SIGNIFICANT CHANGE UP (ref 39–50)
HGB BLD-MCNC: 12.5 G/DL — LOW (ref 13–17)
MAGNESIUM SERPL-MCNC: 2.3 MG/DL — SIGNIFICANT CHANGE UP (ref 1.6–2.6)
MCHC RBC-ENTMCNC: 25.5 PG — LOW (ref 27–34)
MCHC RBC-ENTMCNC: 30.2 G/DL — LOW (ref 32–36)
MCV RBC AUTO: 84.3 FL — SIGNIFICANT CHANGE UP (ref 80–100)
NRBC # BLD: 0 /100 WBCS — SIGNIFICANT CHANGE UP (ref 0–0)
PHOSPHATE SERPL-MCNC: 3 MG/DL — SIGNIFICANT CHANGE UP (ref 2.5–4.5)
PLATELET # BLD AUTO: 249 K/UL — SIGNIFICANT CHANGE UP (ref 150–400)
POTASSIUM SERPL-MCNC: 4.4 MMOL/L — SIGNIFICANT CHANGE UP (ref 3.5–5.3)
POTASSIUM SERPL-SCNC: 4.4 MMOL/L — SIGNIFICANT CHANGE UP (ref 3.5–5.3)
RBC # BLD: 4.91 M/UL — SIGNIFICANT CHANGE UP (ref 4.2–5.8)
RBC # FLD: 14.5 % — SIGNIFICANT CHANGE UP (ref 10.3–14.5)
SARS-COV-2 RNA SPEC QL NAA+PROBE: DETECTED
SODIUM SERPL-SCNC: 139 MMOL/L — SIGNIFICANT CHANGE UP (ref 135–145)
WBC # BLD: 9.98 K/UL — SIGNIFICANT CHANGE UP (ref 3.8–10.5)
WBC # FLD AUTO: 9.98 K/UL — SIGNIFICANT CHANGE UP (ref 3.8–10.5)

## 2022-09-15 PROCEDURE — 99232 SBSQ HOSP IP/OBS MODERATE 35: CPT

## 2022-09-15 RX ADMIN — Medication 1 TABLET(S): at 12:41

## 2022-09-15 RX ADMIN — MEMANTINE HYDROCHLORIDE 10 MILLIGRAM(S): 10 TABLET ORAL at 12:41

## 2022-09-15 RX ADMIN — TAMSULOSIN HYDROCHLORIDE 0.4 MILLIGRAM(S): 0.4 CAPSULE ORAL at 21:27

## 2022-09-15 RX ADMIN — ENOXAPARIN SODIUM 40 MILLIGRAM(S): 100 INJECTION SUBCUTANEOUS at 12:41

## 2022-09-15 RX ADMIN — SIMVASTATIN 20 MILLIGRAM(S): 20 TABLET, FILM COATED ORAL at 21:27

## 2022-09-15 RX ADMIN — DONEPEZIL HYDROCHLORIDE 10 MILLIGRAM(S): 10 TABLET, FILM COATED ORAL at 21:27

## 2022-09-16 LAB
APPEARANCE UR: ABNORMAL
BACTERIA # UR AUTO: ABNORMAL
BASOPHILS # BLD AUTO: 0.03 K/UL — SIGNIFICANT CHANGE UP (ref 0–0.2)
BASOPHILS NFR BLD AUTO: 0.2 % — SIGNIFICANT CHANGE UP (ref 0–2)
BILIRUB UR-MCNC: NEGATIVE — SIGNIFICANT CHANGE UP
COLOR SPEC: YELLOW — SIGNIFICANT CHANGE UP
DIFF PNL FLD: ABNORMAL
EOSINOPHIL # BLD AUTO: 0 K/UL — SIGNIFICANT CHANGE UP (ref 0–0.5)
EOSINOPHIL NFR BLD AUTO: 0 % — SIGNIFICANT CHANGE UP (ref 0–6)
EPI CELLS # UR: SIGNIFICANT CHANGE UP
GLUCOSE BLDC GLUCOMTR-MCNC: 115 MG/DL — HIGH (ref 70–99)
GLUCOSE BLDC GLUCOMTR-MCNC: 118 MG/DL — HIGH (ref 70–99)
GLUCOSE BLDC GLUCOMTR-MCNC: 120 MG/DL — HIGH (ref 70–99)
GLUCOSE BLDC GLUCOMTR-MCNC: 138 MG/DL — HIGH (ref 70–99)
GLUCOSE UR QL: NEGATIVE MG/DL — SIGNIFICANT CHANGE UP
HCT VFR BLD CALC: 37.9 % — LOW (ref 39–50)
HCT VFR BLD CALC: 38.5 % — LOW (ref 39–50)
HGB BLD-MCNC: 11.7 G/DL — LOW (ref 13–17)
HGB BLD-MCNC: 11.8 G/DL — LOW (ref 13–17)
IMM GRANULOCYTES NFR BLD AUTO: 0.5 % — SIGNIFICANT CHANGE UP (ref 0–0.9)
KETONES UR-MCNC: ABNORMAL
LEUKOCYTE ESTERASE UR-ACNC: NEGATIVE — SIGNIFICANT CHANGE UP
LYMPHOCYTES # BLD AUTO: 1.07 K/UL — SIGNIFICANT CHANGE UP (ref 1–3.3)
LYMPHOCYTES # BLD AUTO: 8.7 % — LOW (ref 13–44)
MCHC RBC-ENTMCNC: 25.5 PG — LOW (ref 27–34)
MCHC RBC-ENTMCNC: 25.6 PG — LOW (ref 27–34)
MCHC RBC-ENTMCNC: 30.6 G/DL — LOW (ref 32–36)
MCHC RBC-ENTMCNC: 30.9 G/DL — LOW (ref 32–36)
MCV RBC AUTO: 82.8 FL — SIGNIFICANT CHANGE UP (ref 80–100)
MCV RBC AUTO: 83.5 FL — SIGNIFICANT CHANGE UP (ref 80–100)
MONOCYTES # BLD AUTO: 0.83 K/UL — SIGNIFICANT CHANGE UP (ref 0–0.9)
MONOCYTES NFR BLD AUTO: 6.7 % — SIGNIFICANT CHANGE UP (ref 2–14)
NEUTROPHILS # BLD AUTO: 10.36 K/UL — HIGH (ref 1.8–7.4)
NEUTROPHILS NFR BLD AUTO: 83.9 % — HIGH (ref 43–77)
NITRITE UR-MCNC: NEGATIVE — SIGNIFICANT CHANGE UP
NRBC # BLD: 0 /100 WBCS — SIGNIFICANT CHANGE UP (ref 0–0)
NRBC # BLD: 0 /100 WBCS — SIGNIFICANT CHANGE UP (ref 0–0)
PH UR: 5 — SIGNIFICANT CHANGE UP (ref 5–8)
PLATELET # BLD AUTO: 216 K/UL — SIGNIFICANT CHANGE UP (ref 150–400)
PLATELET # BLD AUTO: 235 K/UL — SIGNIFICANT CHANGE UP (ref 150–400)
PROT UR-MCNC: 30 MG/DL
RAPID RVP RESULT: DETECTED
RBC # BLD: 4.58 M/UL — SIGNIFICANT CHANGE UP (ref 4.2–5.8)
RBC # BLD: 4.61 M/UL — SIGNIFICANT CHANGE UP (ref 4.2–5.8)
RBC # FLD: 14.5 % — SIGNIFICANT CHANGE UP (ref 10.3–14.5)
RBC # FLD: 14.5 % — SIGNIFICANT CHANGE UP (ref 10.3–14.5)
RBC CASTS # UR COMP ASSIST: ABNORMAL /HPF (ref 0–4)
SARS-COV-2 RNA SPEC QL NAA+PROBE: DETECTED
SP GR SPEC: 1.02 — SIGNIFICANT CHANGE UP (ref 1.01–1.02)
URATE CRY FLD QL MICRO: ABNORMAL
UROBILINOGEN FLD QL: NEGATIVE MG/DL — SIGNIFICANT CHANGE UP
WBC # BLD: 12.35 K/UL — HIGH (ref 3.8–10.5)
WBC # BLD: 12.59 K/UL — HIGH (ref 3.8–10.5)
WBC # FLD AUTO: 12.35 K/UL — HIGH (ref 3.8–10.5)
WBC # FLD AUTO: 12.59 K/UL — HIGH (ref 3.8–10.5)
WBC UR QL: NEGATIVE — SIGNIFICANT CHANGE UP

## 2022-09-16 PROCEDURE — 71045 X-RAY EXAM CHEST 1 VIEW: CPT | Mod: 26

## 2022-09-16 PROCEDURE — 74018 RADEX ABDOMEN 1 VIEW: CPT | Mod: 26

## 2022-09-16 PROCEDURE — 99233 SBSQ HOSP IP/OBS HIGH 50: CPT

## 2022-09-16 RX ORDER — SODIUM CHLORIDE 9 MG/ML
1000 INJECTION, SOLUTION INTRAVENOUS
Refills: 0 | Status: DISCONTINUED | OUTPATIENT
Start: 2022-09-16 | End: 2022-09-17

## 2022-09-16 RX ADMIN — Medication 1 TABLET(S): at 12:38

## 2022-09-16 RX ADMIN — SIMVASTATIN 20 MILLIGRAM(S): 20 TABLET, FILM COATED ORAL at 22:55

## 2022-09-16 RX ADMIN — Medication 650 MILLIGRAM(S): at 13:51

## 2022-09-16 RX ADMIN — SODIUM CHLORIDE 50 MILLILITER(S): 9 INJECTION, SOLUTION INTRAVENOUS at 17:49

## 2022-09-16 RX ADMIN — ENOXAPARIN SODIUM 40 MILLIGRAM(S): 100 INJECTION SUBCUTANEOUS at 13:37

## 2022-09-16 RX ADMIN — DONEPEZIL HYDROCHLORIDE 10 MILLIGRAM(S): 10 TABLET, FILM COATED ORAL at 22:50

## 2022-09-16 RX ADMIN — MEMANTINE HYDROCHLORIDE 10 MILLIGRAM(S): 10 TABLET ORAL at 12:37

## 2022-09-16 RX ADMIN — Medication 650 MILLIGRAM(S): at 08:00

## 2022-09-16 RX ADMIN — Medication 650 MILLIGRAM(S): at 12:53

## 2022-09-16 RX ADMIN — TAMSULOSIN HYDROCHLORIDE 0.4 MILLIGRAM(S): 0.4 CAPSULE ORAL at 22:50

## 2022-09-16 RX ADMIN — Medication 650 MILLIGRAM(S): at 06:51

## 2022-09-16 NOTE — PROGRESS NOTE ADULT - PROBLEM SELECTOR PLAN 1
COVID 19 swabbed in ED - +ve results.  Isolation precautions  Tylenol PRN fever  Will hold dexamethasone and remdesivir as patient is not requiring supplemental oxygen
COVID 19 swabbed in ED - +ve results.  Isolation precautions  Tylenol PRN fever  Will hold dexamethasone and remdesivir as patient is not requiring supplemental oxygen  patient remains on room air
COVID 19 swabbed in ED - +ve results.  Isolation precautions  Tylenol PRN fever  Will hold dexamethasone and remdesivir as patient is not requiring supplemental oxygen
COVID 19 swabbed in ED - +ve results.  Isolation precautions  Tylenol PRN fever  Will hold dexamethasone and remdesivir as patient is not requiring supplemental oxygen  patient remains on room air
COVID 19 swabbed in ED - +ve results.  Isolation precautions  Tylenol PRN fever  Will hold dexamethasone and remdesivir as patient is not requiring supplemental oxygen
COVID 19 swabbed in ED - +ve results.  Isolation precautions  Tylenol PRN fever  Will hold dexamethasone and remdesivir as patient is not requiring supplemental oxygen  patient remains on room air
COVID 19 swabbed in ED - +ve results.  Isolation precautions  Tylenol PRN fever  Will hold dexamethasone and remdesivir as patient is not requiring supplemental oxygen

## 2022-09-16 NOTE — PROGRESS NOTE ADULT - PROBLEM SELECTOR PLAN 6
amlodipine

## 2022-09-16 NOTE — PROGRESS NOTE ADULT - PROBLEM SELECTOR PLAN 4
tamsulosin
tamsulosin  bladder scan PVR 3cc
tamsulosin
tamsulosin  bladder scan PVR 3cc
tamsulosin
tamsulosin
tamsulosin  bladder scan PVR 3cc
tamsulosin

## 2022-09-16 NOTE — PROGRESS NOTE ADULT - PROBLEM SELECTOR PLAN 2
WBCs and leuk esterase in urine  Started on ceftriaxone in ED.  urine cx growing enterococcus so will change to Augmentin
WBCs and leuk esterase in urine  completed course of abx
WBCs and leuk esterase in urine  completed course of abx
WBCs and leuk esterase in urine  Started on ceftriaxone in ED.  urine cx growing enterococcus so will change to microbid   remains afebrile and normal wbc. eating well. usure if this si a true uti but pt still not at baseline MS but is close
WBCs and leuk esterase in urine  Started on ceftriaxone in ED.  Continue for now  remains afebrile   Deescalate antibiotic when cultures return
WBCs and leuk esterase in urine  Started on ceftriaxone in ED.  urine cx growing enterococcus so will change to Augmentin   remains afebrile and normal wbc. eating well. usure if this si a true uti but pt still not at baseline MS but is close
WBCs and leuk esterase in urine  Started on ceftriaxone in ED.  urine cx growing enterococcus so will change to Augmentin
WBCs and leuk esterase in urine  Started on ceftriaxone in ED.  Continue for now  Deescalate antibiotic when cultures return
WBCs and leuk esterase in urine  Started on ceftriaxone in ED.  urine cx growing enterococcus so will change to Augmentin
WBCs and leuk esterase in urine  completed course of abx

## 2022-09-16 NOTE — PROGRESS NOTE ADULT - PROBLEM SELECTOR PROBLEM 2
UTI (urinary tract infection)

## 2022-09-16 NOTE — PROGRESS NOTE ADULT - NUTRITIONAL ASSESSMENT
This patient has been assessed with a concern for Malnutrition and has been determined to have a diagnosis/diagnoses of Severe protein-calorie malnutrition.    This patient is being managed with:   Diet Pureed-  Consistent Carbohydrate {No Snacks}  Supplement Feeding Modality:  Oral  Glucerna Shake Cans or Servings Per Day:  1       Frequency:  Three Times a day  Entered: Sep 13 2022 12:16PM    
This patient has been assessed with a concern for Malnutrition and has been determined to have a diagnosis/diagnoses of Severe protein-calorie malnutrition.    This patient is being managed with:   Diet Pureed-  Consistent Carbohydrate {No Snacks}  Supplement Feeding Modality:  Oral  Glucerna Shake Cans or Servings Per Day:  1       Frequency:  Three Times a day  Entered: Sep 13 2022 12:16PM    Diet Pureed-  Consistent Carbohydrate {No Snacks}  Supplement Feeding Modality:  Oral  Glucerna Shake Cans or Servings Per Day:  1       Frequency:  Daily  Entered: Sep 10 2022 10:13AM    The following pending diet order is being considered for treatment of Severe protein-calorie malnutrition:null

## 2022-09-16 NOTE — PROGRESS NOTE ADULT - PROBLEM SELECTOR PLAN 5
memantine, donepezil
memantine, donepezil
memantine, donepezil  supportive care   frequent repositioning to prevent pressure wounds
memantine, donepezil
memantine, donepezil  supportive care   frequent repositioning to prevent pressure wounds
memantine, donepezil
memantine, donepezil  supportive care   frequent repositioning to prevent pressure wounds
memantine, donepezil

## 2022-09-16 NOTE — PROGRESS NOTE ADULT - PROBLEM SELECTOR PROBLEM 5
Alzheimer's dementia

## 2022-09-16 NOTE — PROGRESS NOTE ADULT - SUBJECTIVE AND OBJECTIVE BOX
Patient is a 87y old  Male who presents with a chief complaint of COVID, AMS (06 Sep 2022 22:32)      INTERVAL HPI/OVERNIGHT EVENTS: none    MEDICATIONS  (STANDING):  amLODIPine   Tablet 5 milliGRAM(s) Oral daily  cefTRIAXone   IVPB 1000 milliGRAM(s) IV Intermittent every 24 hours  dextrose 5%. 1000 milliLiter(s) (100 mL/Hr) IV Continuous <Continuous>  dextrose 5%. 1000 milliLiter(s) (50 mL/Hr) IV Continuous <Continuous>  dextrose 50% Injectable 25 Gram(s) IV Push once  dextrose 50% Injectable 12.5 Gram(s) IV Push once  dextrose 50% Injectable 25 Gram(s) IV Push once  donepezil 10 milliGRAM(s) Oral at bedtime  enoxaparin Injectable 40 milliGRAM(s) SubCutaneous every 24 hours  glucagon  Injectable 1 milliGRAM(s) IntraMuscular once  insulin lispro (ADMELOG) corrective regimen sliding scale   SubCutaneous three times a day before meals  memantine 10 milliGRAM(s) Oral daily  simvastatin 20 milliGRAM(s) Oral at bedtime  tamsulosin 0.4 milliGRAM(s) Oral at bedtime    MEDICATIONS  (PRN):  acetaminophen     Tablet .. 650 milliGRAM(s) Oral every 6 hours PRN Temp greater or equal to 38C (100.4F)  dextrose Oral Gel 15 Gram(s) Oral once PRN Blood Glucose LESS THAN 70 milliGRAM(s)/deciliter        Allergies    No Known Allergies    Intolerances        Vital Signs Last 24 Hrs  T(C): 36.3 (10 Sep 2022 11:08), Max: 36.8 (09 Sep 2022 17:28)  T(F): 97.4 (10 Sep 2022 11:08), Max: 98.3 (09 Sep 2022 17:28)  HR: 100 (10 Sep 2022 11:08) (70 - 100)  BP: 126/84 (10 Sep 2022 11:08) (122/74 - 126/84)  BP(mean): --  RR: 17 (10 Sep 2022 11:08) (17 - 17)  SpO2: 95% (10 Sep 2022 11:08) (95% - 97%)                    PHYSICAL EXAM:  GENERAL: NAD  HEAD:  Atraumatic, Normocephalic  EYES: EOMI, PERRLA, conjunctiva and sclera clear  ENMT: No tonsillar erythema, exudates, or enlargement;   NECK: Supple, Normal thyroid  NERVOUS SYSTEM:  Alert & Oriented X0, FROM x4   CHEST/LUNG: CTABL; No rales, rhonchi, wheezing, or rubs  HEART: Regular rate and rhythm; No murmurs, rubs, or gallops  ABDOMEN: Soft, Nontender, Nondistended; Bowel sounds present  EXTREMITIES:  2+ Peripheral Pulses, No clubbing, cyanosis, or edema  LYMPH: No lymphadenopathy noted  SKIN: No rashes or lesions    LABS:                                14.0   6.76  )-----------( 280      ( 10 Sep 2022 11:35 )             45.0     09-10    139  |  108  |  32<H>  ----------------------------<  118<H>  4.5   |  23  |  1.54<H>    Ca    9.8      10 Sep 2022 11:35                       CAPILLARY BLOOD GLUCOSE      POCT Blood Glucose.: 98 mg/dL (07 Sep 2022 11:20)  POCT Blood Glucose.: 109 mg/dL (07 Sep 2022 07:48)      RADIOLOGY & ADDITIONAL TESTS:    Imaging Personally Reviewed:  [x ] YES  [ ] NO    Consultant(s) Notes Reviewed:  [x ] YES  [ ] NO    Care Discussed with Consultants/Other Providers [ ] YES  [ ] NO
Patient is a 87y old  Male who presents with a chief complaint of COVID, AMS (06 Sep 2022 22:32)      INTERVAL HPI/OVERNIGHT EVENTS: none    MEDICATIONS  (STANDING):  amLODIPine   Tablet 5 milliGRAM(s) Oral daily  amoxicillin  875 milliGRAM(s)/clavulanate 1 Tablet(s) Oral every 12 hours  dextrose 5%. 1000 milliLiter(s) (100 mL/Hr) IV Continuous <Continuous>  dextrose 5%. 1000 milliLiter(s) (50 mL/Hr) IV Continuous <Continuous>  dextrose 50% Injectable 25 Gram(s) IV Push once  dextrose 50% Injectable 12.5 Gram(s) IV Push once  dextrose 50% Injectable 25 Gram(s) IV Push once  donepezil 10 milliGRAM(s) Oral at bedtime  enoxaparin Injectable 40 milliGRAM(s) SubCutaneous every 24 hours  glucagon  Injectable 1 milliGRAM(s) IntraMuscular once  insulin lispro (ADMELOG) corrective regimen sliding scale   SubCutaneous three times a day before meals  memantine 10 milliGRAM(s) Oral daily  simvastatin 20 milliGRAM(s) Oral at bedtime  tamsulosin 0.4 milliGRAM(s) Oral at bedtime    MEDICATIONS  (PRN):  acetaminophen     Tablet .. 650 milliGRAM(s) Oral every 6 hours PRN Temp greater or equal to 38C (100.4F)  dextrose Oral Gel 15 Gram(s) Oral once PRN Blood Glucose LESS THAN 70 milliGRAM(s)/deciliter        Allergies    No Known Allergies    Intolerances      Vital Signs Last 24 Hrs  T(C): 37.1 (12 Sep 2022 05:24), Max: 37.1 (12 Sep 2022 05:24)  T(F): 98.7 (12 Sep 2022 05:24), Max: 98.7 (12 Sep 2022 05:24)  HR: 61 (12 Sep 2022 05:24) (61 - 78)  BP: 139/83 (12 Sep 2022 05:24) (125/70 - 139/83)  BP(mean): --  RR: 18 (12 Sep 2022 05:24) (17 - 18)  SpO2: 97% (12 Sep 2022 05:24) (96% - 99%)    Parameters below as of 12 Sep 2022 05:24  Patient On (Oxygen Delivery Method): room air                    PHYSICAL EXAM:  GENERAL: NAD  HEAD:  Atraumatic, Normocephalic  EYES: EOMI, PERRLA, conjunctiva and sclera clear  ENMT: No tonsillar erythema, exudates, or enlargement;   NECK: Supple, Normal thyroid  NERVOUS SYSTEM:  Alert & Oriented X0, FROM x4   CHEST/LUNG: CTABL; No rales, rhonchi, wheezing, or rubs  HEART: Regular rate and rhythm; No murmurs, rubs, or gallops  ABDOMEN: Soft, Nontender, Nondistended; Bowel sounds present  EXTREMITIES:  2+ Peripheral Pulses, No clubbing, cyanosis, or edema  LYMPH: No lymphadenopathy noted  SKIN: No rashes or lesions    LABS:                                        CAPILLARY BLOOD GLUCOSE      POCT Blood Glucose.: 98 mg/dL (07 Sep 2022 11:20)  POCT Blood Glucose.: 109 mg/dL (07 Sep 2022 07:48)      RADIOLOGY & ADDITIONAL TESTS:    Imaging Personally Reviewed:  [x ] YES  [ ] NO    Consultant(s) Notes Reviewed:  [x ] YES  [ ] NO    Care Discussed with Consultants/Other Providers [ ] YES  [ ] NO
Patient is a 87y old  Male who presents with a chief complaint of COVID, AMS (06 Sep 2022 22:32)    Patient seen and examined bedside this AM  INTERVAL HPI/OVERNIGHT EVENTS: none  this AM low grade fever and mild leukocytosis     ROS: unable to examine due to [ ] Encephalopathy  [ x] Advanced Dementia  [ ] Expressive Aphasia  [x ] Non-verbal patient       MEDICATIONS  (STANDING):  amLODIPine   Tablet 5 milliGRAM(s) Oral daily  amoxicillin  875 milliGRAM(s)/clavulanate 1 Tablet(s) Oral every 12 hours  dextrose 5%. 1000 milliLiter(s) (100 mL/Hr) IV Continuous <Continuous>  dextrose 5%. 1000 milliLiter(s) (50 mL/Hr) IV Continuous <Continuous>  dextrose 50% Injectable 25 Gram(s) IV Push once  dextrose 50% Injectable 12.5 Gram(s) IV Push once  dextrose 50% Injectable 25 Gram(s) IV Push once  donepezil 10 milliGRAM(s) Oral at bedtime  enoxaparin Injectable 40 milliGRAM(s) SubCutaneous every 24 hours  glucagon  Injectable 1 milliGRAM(s) IntraMuscular once  insulin lispro (ADMELOG) corrective regimen sliding scale   SubCutaneous three times a day before meals  memantine 10 milliGRAM(s) Oral daily  simvastatin 20 milliGRAM(s) Oral at bedtime  tamsulosin 0.4 milliGRAM(s) Oral at bedtime    MEDICATIONS  (PRN):  acetaminophen     Tablet .. 650 milliGRAM(s) Oral every 6 hours PRN Temp greater or equal to 38C (100.4F)  dextrose Oral Gel 15 Gram(s) Oral once PRN Blood Glucose LESS THAN 70 milliGRAM(s)/deciliter        Allergies    No Known Allergies    Intolerances    Vital Signs Last 24 Hrs  T(C): 36.2 (14 Sep 2022 19:00), Max: 37.6 (14 Sep 2022 16:32)  T(F): 97.1 (14 Sep 2022 19:00), Max: 99.6 (14 Sep 2022 16:32)  HR: 96 (14 Sep 2022 19:00) (65 - 154)  BP: 149/73 (14 Sep 2022 19:00) (91/67 - 149/73)  BP(mean): --  RR: 18 (14 Sep 2022 19:00) (17 - 18)  SpO2: 97% (14 Sep 2022 19:00) (96% - 99%)    Parameters below as of 14 Sep 2022 19:00  Patient On (Oxygen Delivery Method): room air          PHYSICAL EXAM:  GENERAL: NAD, no increased WOB   HEAD:  Atraumatic, Normocephalic  EYES: EOMI, PERRLA, conjunctiva and sclera clear  NECK: Supple   NERVOUS SYSTEM: awake, nonverbal   CHEST/LUNG: CTAB  HEART: Regular rate and rhythm; No murmurs, rubs, or gallops  ABDOMEN: Soft, Nontender, Nondistended; Bowel sounds present  EXTREMITIES:  2+ Peripheral Pulses b/l,  No edema b/l LE     LABS:                                                  11.7   12.35 )-----------( 216      ( 16 Sep 2022 10:45 )             37.9   09-15    139  |  110<H>  |  26<H>  ----------------------------<  120<H>  4.4   |  20<L>  |  1.48<H>    Ca    9.6      15 Sep 2022 07:50  Phos  3.0     09-15  Mg     2.3     09-15              RADIOLOGY & ADDITIONAL TESTS:    Imaging Personally Reviewed:  [ ] YES  [ ] NO    Consultant(s) Notes Reviewed:  [x ] YES  [ ] NO    Care Discussed with Consultants/Other Providers [ ] YES  [ ] NO  Care plan and all findings were discussed in detail with patient at bedside and wife (over the phone).  All questions and concerns addressed
Patient is a 87y old  Male who presents with a chief complaint of COVID, AMS (06 Sep 2022 22:32)      INTERVAL HPI/OVERNIGHT EVENTS: none    MEDICATIONS  (STANDING):  amLODIPine   Tablet 5 milliGRAM(s) Oral daily  amoxicillin  875 milliGRAM(s)/clavulanate 1 Tablet(s) Oral every 12 hours  dextrose 5%. 1000 milliLiter(s) (100 mL/Hr) IV Continuous <Continuous>  dextrose 5%. 1000 milliLiter(s) (50 mL/Hr) IV Continuous <Continuous>  dextrose 50% Injectable 25 Gram(s) IV Push once  dextrose 50% Injectable 12.5 Gram(s) IV Push once  dextrose 50% Injectable 25 Gram(s) IV Push once  donepezil 10 milliGRAM(s) Oral at bedtime  enoxaparin Injectable 40 milliGRAM(s) SubCutaneous every 24 hours  glucagon  Injectable 1 milliGRAM(s) IntraMuscular once  insulin lispro (ADMELOG) corrective regimen sliding scale   SubCutaneous three times a day before meals  memantine 10 milliGRAM(s) Oral daily  simvastatin 20 milliGRAM(s) Oral at bedtime  tamsulosin 0.4 milliGRAM(s) Oral at bedtime    MEDICATIONS  (PRN):  acetaminophen     Tablet .. 650 milliGRAM(s) Oral every 6 hours PRN Temp greater or equal to 38C (100.4F)  dextrose Oral Gel 15 Gram(s) Oral once PRN Blood Glucose LESS THAN 70 milliGRAM(s)/deciliter        Allergies    No Known Allergies    Intolerances      Vital Signs Last 24 Hrs  T(C): 37.1 (12 Sep 2022 05:24), Max: 37.1 (12 Sep 2022 05:24)  T(F): 98.7 (12 Sep 2022 05:24), Max: 98.7 (12 Sep 2022 05:24)  HR: 61 (12 Sep 2022 05:24) (61 - 78)  BP: 139/83 (12 Sep 2022 05:24) (125/70 - 139/83)  BP(mean): --  RR: 18 (12 Sep 2022 05:24) (17 - 18)  SpO2: 97% (12 Sep 2022 05:24) (96% - 99%)    Parameters below as of 12 Sep 2022 05:24  Patient On (Oxygen Delivery Method): room air                    PHYSICAL EXAM:  GENERAL: NAD  HEAD:  Atraumatic, Normocephalic  EYES: EOMI, PERRLA, conjunctiva and sclera clear  ENMT: No tonsillar erythema, exudates, or enlargement;   NECK: Supple, Normal thyroid  NERVOUS SYSTEM:  Alert & Oriented X0, FROM x4   CHEST/LUNG: CTABL; No rales, rhonchi, wheezing, or rubs  HEART: Regular rate and rhythm; No murmurs, rubs, or gallops  ABDOMEN: Soft, Nontender, Nondistended; Bowel sounds present  EXTREMITIES:  2+ Peripheral Pulses, No clubbing, cyanosis, or edema  LYMPH: No lymphadenopathy noted  SKIN: No rashes or lesions    LABS:                                        CAPILLARY BLOOD GLUCOSE      POCT Blood Glucose.: 98 mg/dL (07 Sep 2022 11:20)  POCT Blood Glucose.: 109 mg/dL (07 Sep 2022 07:48)      RADIOLOGY & ADDITIONAL TESTS:    Imaging Personally Reviewed:  [x ] YES  [ ] NO    Consultant(s) Notes Reviewed:  [x ] YES  [ ] NO    Care Discussed with Consultants/Other Providers [ ] YES  [ ] NO
Patient is a 87y old  Male who presents with a chief complaint of COVID, AMS (06 Sep 2022 22:32)      INTERVAL HPI/OVERNIGHT EVENTS: none    MEDICATIONS  (STANDING):  amLODIPine   Tablet 5 milliGRAM(s) Oral daily  cefTRIAXone   IVPB 1000 milliGRAM(s) IV Intermittent every 24 hours  dextrose 5%. 1000 milliLiter(s) (100 mL/Hr) IV Continuous <Continuous>  dextrose 5%. 1000 milliLiter(s) (50 mL/Hr) IV Continuous <Continuous>  dextrose 50% Injectable 25 Gram(s) IV Push once  dextrose 50% Injectable 12.5 Gram(s) IV Push once  dextrose 50% Injectable 25 Gram(s) IV Push once  donepezil 10 milliGRAM(s) Oral at bedtime  enoxaparin Injectable 40 milliGRAM(s) SubCutaneous every 24 hours  glucagon  Injectable 1 milliGRAM(s) IntraMuscular once  insulin lispro (ADMELOG) corrective regimen sliding scale   SubCutaneous three times a day before meals  memantine 10 milliGRAM(s) Oral daily  simvastatin 20 milliGRAM(s) Oral at bedtime  tamsulosin 0.4 milliGRAM(s) Oral at bedtime    MEDICATIONS  (PRN):  acetaminophen     Tablet .. 650 milliGRAM(s) Oral every 6 hours PRN Temp greater or equal to 38C (100.4F)  dextrose Oral Gel 15 Gram(s) Oral once PRN Blood Glucose LESS THAN 70 milliGRAM(s)/deciliter        Allergies    No Known Allergies    Intolerances          Vital Signs Last 24 Hrs  T(C): 36.7 (08 Sep 2022 04:41), Max: 37 (07 Sep 2022 11:51)  T(F): 98.1 (08 Sep 2022 04:41), Max: 98.6 (07 Sep 2022 11:51)  HR: 92 (08 Sep 2022 04:41) (70 - 92)  BP: 115/79 (08 Sep 2022 04:41) (112/74 - 132/85)  BP(mean): --  RR: 17 (08 Sep 2022 04:41) (17 - 18)  SpO2: 98% (08 Sep 2022 04:41) (95% - 99%)    Parameters below as of 08 Sep 2022 04:41  Patient On (Oxygen Delivery Method): room air            PHYSICAL EXAM:  GENERAL: NAD  HEAD:  Atraumatic, Normocephalic  EYES: EOMI, PERRLA, conjunctiva and sclera clear  ENMT: No tonsillar erythema, exudates, or enlargement;   NECK: Supple, Normal thyroid  NERVOUS SYSTEM:  Alert & Oriented X0, FROM x4   CHEST/LUNG: CTABL; No rales, rhonchi, wheezing, or rubs  HEART: Regular rate and rhythm; No murmurs, rubs, or gallops  ABDOMEN: Soft, Nontender, Nondistended; Bowel sounds present  EXTREMITIES:  2+ Peripheral Pulses, No clubbing, cyanosis, or edema  LYMPH: No lymphadenopathy noted  SKIN: No rashes or lesions    LABS:                                            12.6   5.69  )-----------( 240      ( 06 Sep 2022 18:00 )             41.5         138  |  106  |  26<H>  ----------------------------<  101<H>  4.6   |  26  |  1.46<H>    Ca    9.2      07 Sep 2022 14:30    TPro  8.6<H>  /  Alb  2.9<L>  /  TBili  0.3  /  DBili  x   /  AST  37  /  ALT  32  /  AlkPhos  63  -      Urinalysis Basic - ( 06 Sep 2022 20:15 )    Color: Yellow / Appearance: Clear / S.020 / pH: x  Gluc: x / Ketone: Negative  / Bili: Negative / Urobili: Negative mg/dL   Blood: x / Protein: 30 mg/dL / Nitrite: Negative   Leuk Esterase: Trace / RBC: 0-2 /HPF / WBC 26-50   Sq Epi: x / Non Sq Epi: Few / Bacteria: Few          CAPILLARY BLOOD GLUCOSE      POCT Blood Glucose.: 98 mg/dL (07 Sep 2022 11:20)  POCT Blood Glucose.: 109 mg/dL (07 Sep 2022 07:48)      RADIOLOGY & ADDITIONAL TESTS:    Imaging Personally Reviewed:  [x ] YES  [ ] NO    Consultant(s) Notes Reviewed:  [x ] YES  [ ] NO    Care Discussed with Consultants/Other Providers [ ] YES  [ ] NO
Patient is a 87y old  Male who presents with a chief complaint of COVID, AMS (06 Sep 2022 22:32)    Patient seen and examined bedside this AM  INTERVAL HPI/OVERNIGHT EVENTS: none  no further hypotension or tachycardia,   no further diarrhea. 2 semiformed BMs today     ROS: unable to examine due to [ ] Encephalopathy  [ x] Advanced Dementia  [ ] Expressive Aphasia  [x ] Non-verbal patient       MEDICATIONS  (STANDING):  amLODIPine   Tablet 5 milliGRAM(s) Oral daily  amoxicillin  875 milliGRAM(s)/clavulanate 1 Tablet(s) Oral every 12 hours  dextrose 5%. 1000 milliLiter(s) (100 mL/Hr) IV Continuous <Continuous>  dextrose 5%. 1000 milliLiter(s) (50 mL/Hr) IV Continuous <Continuous>  dextrose 50% Injectable 25 Gram(s) IV Push once  dextrose 50% Injectable 12.5 Gram(s) IV Push once  dextrose 50% Injectable 25 Gram(s) IV Push once  donepezil 10 milliGRAM(s) Oral at bedtime  enoxaparin Injectable 40 milliGRAM(s) SubCutaneous every 24 hours  glucagon  Injectable 1 milliGRAM(s) IntraMuscular once  insulin lispro (ADMELOG) corrective regimen sliding scale   SubCutaneous three times a day before meals  memantine 10 milliGRAM(s) Oral daily  simvastatin 20 milliGRAM(s) Oral at bedtime  tamsulosin 0.4 milliGRAM(s) Oral at bedtime    MEDICATIONS  (PRN):  acetaminophen     Tablet .. 650 milliGRAM(s) Oral every 6 hours PRN Temp greater or equal to 38C (100.4F)  dextrose Oral Gel 15 Gram(s) Oral once PRN Blood Glucose LESS THAN 70 milliGRAM(s)/deciliter        Allergies    No Known Allergies    Intolerances    Vital Signs Last 24 Hrs  T(C): 36.2 (14 Sep 2022 19:00), Max: 37.6 (14 Sep 2022 16:32)  T(F): 97.1 (14 Sep 2022 19:00), Max: 99.6 (14 Sep 2022 16:32)  HR: 96 (14 Sep 2022 19:00) (65 - 154)  BP: 149/73 (14 Sep 2022 19:00) (91/67 - 149/73)  BP(mean): --  RR: 18 (14 Sep 2022 19:00) (17 - 18)  SpO2: 97% (14 Sep 2022 19:00) (96% - 99%)    Parameters below as of 14 Sep 2022 19:00  Patient On (Oxygen Delivery Method): room air                        PHYSICAL EXAM:  GENERAL: NAD, no increased WOB   HEAD:  Atraumatic, Normocephalic  EYES: EOMI, PERRLA, conjunctiva and sclera clear  NECK: Supple   NERVOUS SYSTEM: awake, nonverbal   CHEST/LUNG: CTAB  HEART: Regular rate and rhythm; No murmurs, rubs, or gallops  ABDOMEN: Soft, Nontender, Nondistended; Bowel sounds present  EXTREMITIES:  2+ Peripheral Pulses b/l,  No edema b/l LE     LABS:                                       12.5   9.98  )-----------( 249      ( 15 Sep 2022 07:50 )             41.4   09-15    139  |  110<H>  |  26<H>  ----------------------------<  120<H>  4.4   |  20<L>  |  1.48<H>    Ca    9.6      15 Sep 2022 07:50  Phos  3.0     09-15  Mg     2.3     09-15            RADIOLOGY & ADDITIONAL TESTS:    Imaging Personally Reviewed:  [ ] YES  [ ] NO    Consultant(s) Notes Reviewed:  [x ] YES  [ ] NO    Care Discussed with Consultants/Other Providers [ ] YES  [ ] NO  Care plan and all findings were discussed in detail with patient.  All questions and concerns addressed
Patient is a 87y old  Male who presents with a chief complaint of COVID, AMS (06 Sep 2022 22:32)      INTERVAL HPI/OVERNIGHT EVENTS: none    MEDICATIONS  (STANDING):  amLODIPine   Tablet 5 milliGRAM(s) Oral daily  cefTRIAXone   IVPB 1000 milliGRAM(s) IV Intermittent every 24 hours  dextrose 5%. 1000 milliLiter(s) (100 mL/Hr) IV Continuous <Continuous>  dextrose 5%. 1000 milliLiter(s) (50 mL/Hr) IV Continuous <Continuous>  dextrose 50% Injectable 25 Gram(s) IV Push once  dextrose 50% Injectable 12.5 Gram(s) IV Push once  dextrose 50% Injectable 25 Gram(s) IV Push once  donepezil 10 milliGRAM(s) Oral at bedtime  enoxaparin Injectable 40 milliGRAM(s) SubCutaneous every 12 hours  glucagon  Injectable 1 milliGRAM(s) IntraMuscular once  insulin lispro (ADMELOG) corrective regimen sliding scale   SubCutaneous three times a day before meals  memantine 10 milliGRAM(s) Oral daily  simvastatin 20 milliGRAM(s) Oral at bedtime  tamsulosin 0.4 milliGRAM(s) Oral at bedtime    MEDICATIONS  (PRN):  acetaminophen     Tablet .. 650 milliGRAM(s) Oral every 6 hours PRN Temp greater or equal to 38C (100.4F)  dextrose Oral Gel 15 Gram(s) Oral once PRN Blood Glucose LESS THAN 70 milliGRAM(s)/deciliter      Allergies    No Known Allergies    Intolerances          Vital Signs Last 24 Hrs  T(C): 37 (07 Sep 2022 11:51), Max: 37 (06 Sep 2022 17:38)  T(F): 98.6 (07 Sep 2022 11:51), Max: 98.6 (06 Sep 2022 17:38)  HR: 78 (07 Sep 2022 11:51) (66 - 100)  BP: 112/74 (07 Sep 2022 11:51) (106/75 - 129/80)  BP(mean): --  RR: 18 (07 Sep 2022 11:51) (16 - 19)  SpO2: 99% (07 Sep 2022 11:51) (96% - 99%)    Parameters below as of 07 Sep 2022 11:51  Patient On (Oxygen Delivery Method): room air        PHYSICAL EXAM:  GENERAL: NAD  HEAD:  Atraumatic, Normocephalic  EYES: EOMI, PERRLA, conjunctiva and sclera clear  ENMT: No tonsillar erythema, exudates, or enlargement;   NECK: Supple, Normal thyroid  NERVOUS SYSTEM:  Alert & Oriented X0, FROM x4   CHEST/LUNG: CTABL; No rales, rhonchi, wheezing, or rubs  HEART: Regular rate and rhythm; No murmurs, rubs, or gallops  ABDOMEN: Soft, Nontender, Nondistended; Bowel sounds present  EXTREMITIES:  2+ Peripheral Pulses, No clubbing, cyanosis, or edema  LYMPH: No lymphadenopathy noted  SKIN: No rashes or lesions    LABS:                        12.6   5.69  )-----------( 240      ( 06 Sep 2022 18:00 )             41.5         138  |  105  |  27<H>  ----------------------------<  131<H>  5.0   |  28  |  1.50<H>    Ca    9.4      06 Sep 2022 18:00    TPro  8.6<H>  /  Alb  2.9<L>  /  TBili  0.3  /  DBili  x   /  AST  37  /  ALT  32  /  AlkPhos  63  09-      Urinalysis Basic - ( 06 Sep 2022 20:15 )    Color: Yellow / Appearance: Clear / S.020 / pH: x  Gluc: x / Ketone: Negative  / Bili: Negative / Urobili: Negative mg/dL   Blood: x / Protein: 30 mg/dL / Nitrite: Negative   Leuk Esterase: Trace / RBC: 0-2 /HPF / WBC 26-50   Sq Epi: x / Non Sq Epi: Few / Bacteria: Few      CAPILLARY BLOOD GLUCOSE      POCT Blood Glucose.: 98 mg/dL (07 Sep 2022 11:20)  POCT Blood Glucose.: 109 mg/dL (07 Sep 2022 07:48)      RADIOLOGY & ADDITIONAL TESTS:    Imaging Personally Reviewed:  [x ] YES  [ ] NO    Consultant(s) Notes Reviewed:  [x ] YES  [ ] NO    Care Discussed with Consultants/Other Providers [ ] YES  [ ] NO
Patient is a 87y old  Male who presents with a chief complaint of COVID, AMS (06 Sep 2022 22:32)      INTERVAL HPI/OVERNIGHT EVENTS: none    MEDICATIONS  (STANDING):  amLODIPine   Tablet 5 milliGRAM(s) Oral daily  cefTRIAXone   IVPB 1000 milliGRAM(s) IV Intermittent every 24 hours  dextrose 5%. 1000 milliLiter(s) (100 mL/Hr) IV Continuous <Continuous>  dextrose 5%. 1000 milliLiter(s) (50 mL/Hr) IV Continuous <Continuous>  dextrose 50% Injectable 25 Gram(s) IV Push once  dextrose 50% Injectable 12.5 Gram(s) IV Push once  dextrose 50% Injectable 25 Gram(s) IV Push once  donepezil 10 milliGRAM(s) Oral at bedtime  enoxaparin Injectable 40 milliGRAM(s) SubCutaneous every 24 hours  glucagon  Injectable 1 milliGRAM(s) IntraMuscular once  insulin lispro (ADMELOG) corrective regimen sliding scale   SubCutaneous three times a day before meals  memantine 10 milliGRAM(s) Oral daily  simvastatin 20 milliGRAM(s) Oral at bedtime  tamsulosin 0.4 milliGRAM(s) Oral at bedtime    MEDICATIONS  (PRN):  acetaminophen     Tablet .. 650 milliGRAM(s) Oral every 6 hours PRN Temp greater or equal to 38C (100.4F)  dextrose Oral Gel 15 Gram(s) Oral once PRN Blood Glucose LESS THAN 70 milliGRAM(s)/deciliter        Allergies    No Known Allergies    Intolerances          Vital Signs Last 24 Hrs  T(C): 36.3 (09 Sep 2022 11:04), Max: 36.9 (08 Sep 2022 17:24)  T(F): 97.3 (09 Sep 2022 11:04), Max: 98.5 (08 Sep 2022 17:24)  HR: 83 (09 Sep 2022 11:04) (80 - 86)  BP: 109/78 (09 Sep 2022 11:04) (109/78 - 133/76)  BP(mean): --  RR: 18 (09 Sep 2022 11:04) (17 - 18)  SpO2: 97% (09 Sep 2022 11:04) (95% - 98%)    Parameters below as of 09 Sep 2022 04:55  Patient On (Oxygen Delivery Method): room air                PHYSICAL EXAM:  GENERAL: NAD  HEAD:  Atraumatic, Normocephalic  EYES: EOMI, PERRLA, conjunctiva and sclera clear  ENMT: No tonsillar erythema, exudates, or enlargement;   NECK: Supple, Normal thyroid  NERVOUS SYSTEM:  Alert & Oriented X0, FROM x4   CHEST/LUNG: CTABL; No rales, rhonchi, wheezing, or rubs  HEART: Regular rate and rhythm; No murmurs, rubs, or gallops  ABDOMEN: Soft, Nontender, Nondistended; Bowel sounds present  EXTREMITIES:  2+ Peripheral Pulses, No clubbing, cyanosis, or edema  LYMPH: No lymphadenopathy noted  SKIN: No rashes or lesions    LABS:                       CAPILLARY BLOOD GLUCOSE      POCT Blood Glucose.: 98 mg/dL (07 Sep 2022 11:20)  POCT Blood Glucose.: 109 mg/dL (07 Sep 2022 07:48)      RADIOLOGY & ADDITIONAL TESTS:    Imaging Personally Reviewed:  [x ] YES  [ ] NO    Consultant(s) Notes Reviewed:  [x ] YES  [ ] NO    Care Discussed with Consultants/Other Providers [ ] YES  [ ] NO
Patient is a 87y old  Male who presents with a chief complaint of COVID, AMS (06 Sep 2022 22:32)      INTERVAL HPI/OVERNIGHT EVENTS: none    MEDICATIONS  (STANDING):  amLODIPine   Tablet 5 milliGRAM(s) Oral daily  cefTRIAXone   IVPB 1000 milliGRAM(s) IV Intermittent every 24 hours  dextrose 5%. 1000 milliLiter(s) (100 mL/Hr) IV Continuous <Continuous>  dextrose 5%. 1000 milliLiter(s) (50 mL/Hr) IV Continuous <Continuous>  dextrose 50% Injectable 25 Gram(s) IV Push once  dextrose 50% Injectable 12.5 Gram(s) IV Push once  dextrose 50% Injectable 25 Gram(s) IV Push once  donepezil 10 milliGRAM(s) Oral at bedtime  enoxaparin Injectable 40 milliGRAM(s) SubCutaneous every 24 hours  glucagon  Injectable 1 milliGRAM(s) IntraMuscular once  insulin lispro (ADMELOG) corrective regimen sliding scale   SubCutaneous three times a day before meals  memantine 10 milliGRAM(s) Oral daily  simvastatin 20 milliGRAM(s) Oral at bedtime  tamsulosin 0.4 milliGRAM(s) Oral at bedtime    MEDICATIONS  (PRN):  acetaminophen     Tablet .. 650 milliGRAM(s) Oral every 6 hours PRN Temp greater or equal to 38C (100.4F)  dextrose Oral Gel 15 Gram(s) Oral once PRN Blood Glucose LESS THAN 70 milliGRAM(s)/deciliter        Allergies    No Known Allergies    Intolerances        Vital Signs Last 24 Hrs  T(C): 36.3 (10 Sep 2022 11:08), Max: 36.8 (09 Sep 2022 17:28)  T(F): 97.4 (10 Sep 2022 11:08), Max: 98.3 (09 Sep 2022 17:28)  HR: 100 (10 Sep 2022 11:08) (70 - 100)  BP: 126/84 (10 Sep 2022 11:08) (122/74 - 126/84)  BP(mean): --  RR: 17 (10 Sep 2022 11:08) (17 - 17)  SpO2: 95% (10 Sep 2022 11:08) (95% - 97%)                    PHYSICAL EXAM:  GENERAL: NAD  HEAD:  Atraumatic, Normocephalic  EYES: EOMI, PERRLA, conjunctiva and sclera clear  ENMT: No tonsillar erythema, exudates, or enlargement;   NECK: Supple, Normal thyroid  NERVOUS SYSTEM:  Alert & Oriented X0, FROM x4   CHEST/LUNG: CTABL; No rales, rhonchi, wheezing, or rubs  HEART: Regular rate and rhythm; No murmurs, rubs, or gallops  ABDOMEN: Soft, Nontender, Nondistended; Bowel sounds present  EXTREMITIES:  2+ Peripheral Pulses, No clubbing, cyanosis, or edema  LYMPH: No lymphadenopathy noted  SKIN: No rashes or lesions    LABS:                                14.0   6.76  )-----------( 280      ( 10 Sep 2022 11:35 )             45.0     09-10    139  |  108  |  32<H>  ----------------------------<  118<H>  4.5   |  23  |  1.54<H>    Ca    9.8      10 Sep 2022 11:35                       CAPILLARY BLOOD GLUCOSE      POCT Blood Glucose.: 98 mg/dL (07 Sep 2022 11:20)  POCT Blood Glucose.: 109 mg/dL (07 Sep 2022 07:48)      RADIOLOGY & ADDITIONAL TESTS:    Imaging Personally Reviewed:  [x ] YES  [ ] NO    Consultant(s) Notes Reviewed:  [x ] YES  [ ] NO    Care Discussed with Consultants/Other Providers [ ] YES  [ ] NO
Patient is a 87y old  Male who presents with a chief complaint of COVID, AMS (06 Sep 2022 22:32)    Patient seen and examined bedside this AM  INTERVAL HPI/OVERNIGHT EVENTS: none  today afternoon patient was borderline hypotensive and tachycardic to 150s  per staff, patient has been having multiple daily episodes of watery stool for last several days, today stool was semiformed   after 2L IVF, HR normalized and BP improved   bladder scan showed PVR of 3cc per RN     ROS: unable to examine due to [ ] Encephalopathy  [ x] Advanced Dementia  [ ] Expressive Aphasia  [x ] Non-verbal patient       MEDICATIONS  (STANDING):  amLODIPine   Tablet 5 milliGRAM(s) Oral daily  amoxicillin  875 milliGRAM(s)/clavulanate 1 Tablet(s) Oral every 12 hours  dextrose 5%. 1000 milliLiter(s) (100 mL/Hr) IV Continuous <Continuous>  dextrose 5%. 1000 milliLiter(s) (50 mL/Hr) IV Continuous <Continuous>  dextrose 50% Injectable 25 Gram(s) IV Push once  dextrose 50% Injectable 12.5 Gram(s) IV Push once  dextrose 50% Injectable 25 Gram(s) IV Push once  donepezil 10 milliGRAM(s) Oral at bedtime  enoxaparin Injectable 40 milliGRAM(s) SubCutaneous every 24 hours  glucagon  Injectable 1 milliGRAM(s) IntraMuscular once  insulin lispro (ADMELOG) corrective regimen sliding scale   SubCutaneous three times a day before meals  memantine 10 milliGRAM(s) Oral daily  simvastatin 20 milliGRAM(s) Oral at bedtime  tamsulosin 0.4 milliGRAM(s) Oral at bedtime    MEDICATIONS  (PRN):  acetaminophen     Tablet .. 650 milliGRAM(s) Oral every 6 hours PRN Temp greater or equal to 38C (100.4F)  dextrose Oral Gel 15 Gram(s) Oral once PRN Blood Glucose LESS THAN 70 milliGRAM(s)/deciliter        Allergies    No Known Allergies    Intolerances    Vital Signs Last 24 Hrs  T(C): 36.2 (14 Sep 2022 19:00), Max: 37.6 (14 Sep 2022 16:32)  T(F): 97.1 (14 Sep 2022 19:00), Max: 99.6 (14 Sep 2022 16:32)  HR: 96 (14 Sep 2022 19:00) (65 - 154)  BP: 149/73 (14 Sep 2022 19:00) (91/67 - 149/73)  BP(mean): --  RR: 18 (14 Sep 2022 19:00) (17 - 18)  SpO2: 97% (14 Sep 2022 19:00) (96% - 99%)    Parameters below as of 14 Sep 2022 19:00  Patient On (Oxygen Delivery Method): room air                        PHYSICAL EXAM:  GENERAL: NAD, no increased WOB   HEAD:  Atraumatic, Normocephalic  EYES: EOMI, PERRLA, conjunctiva and sclera clear  NECK: Supple   NERVOUS SYSTEM: awake, nonverbal   CHEST/LUNG: CTAB  HEART: Regular rate and rhythm; No murmurs, rubs, or gallops  ABDOMEN: Soft, Nontender, Nondistended; Bowel sounds present  EXTREMITIES:  2+ Peripheral Pulses b/l,  No edema b/l LE     LABS:                   no new labs           RADIOLOGY & ADDITIONAL TESTS:    Imaging Personally Reviewed:  [ ] YES  [ ] NO    Consultant(s) Notes Reviewed:  [x ] YES  [ ] NO    Care Discussed with Consultants/Other Providers [ ] YES  [ ] NO  Care plan and all findings were discussed in detail with patient.  All questions and concerns addressed

## 2022-09-16 NOTE — PROGRESS NOTE ADULT - TIME BILLING
min spent reviewing chart, examining patient, discussing plan with patient and family
min spent reviewing chart, examining patient, discussing plan with patient and family
min spent reviewing chart, examining patient, discussing plan with patient and family and staff, writing progress note and placing orders.

## 2022-09-16 NOTE — PROGRESS NOTE ADULT - PROBLEM SELECTOR PLAN 3
insulin corrective scale
A1c 6.7%  monitor off insulin   avoid hypoglycemia   FS goal 140-180
insulin corrective scale
A1c 6.7%  monitor off insulin   avoid hypoglycemia   FS goal 140-180
A1c 6.7%  monitor off insulin   avoid hypoglycemia   FS goal 140-180

## 2022-09-16 NOTE — CHART NOTE - NSCHARTNOTEFT_GEN_A_CORE
Pt presented to ED c AMS; cough & fever PTA; found c COVID & UTI,  Pt remains COVID+, lethargic, confused, disoriented. PMHx includes Dementia, HTN, DM, HLD, BPH.     Factors impacting intake: [ ] none [ ] nausea  [ ] vomiting [ ] diarrhea [ ] constipation  [ ]chewing problems [ ] swallowing issues  [X ] other: acute illness, persistent lack of appetite    Diet Prescription: Diet, Pureed:   Consistent Carbohydrate {No Snacks}  Supplement Feeding Modality:  Oral  Glucerna Shake Cans or Servings Per Day:  1       Frequency:  Three Times a day (09-13-22 @ 12:17)    Intake: intake remains poor; <50% most meals per staff feedback; drinks some of supplement c encouragement; requires total assistance    Current Weight:   last recorded wt 67.8 kg (6/13); admission wt 67.4 kg (6/7)  % Weight Change:  <1% wt gain x 6 days    No edema noted throughout admission    Pertinent Medications: MEDICATIONS  (STANDING):  dextrose 5%. 1000 milliLiter(s) (100 mL/Hr) IV Continuous <Continuous>  dextrose 5%. 1000 milliLiter(s) (50 mL/Hr) IV Continuous <Continuous>  dextrose 50% Injectable 25 Gram(s) IV Push once  dextrose 50% Injectable 12.5 Gram(s) IV Push once  dextrose 50% Injectable 25 Gram(s) IV Push once  donepezil 10 milliGRAM(s) Oral at bedtime  enoxaparin Injectable 40 milliGRAM(s) SubCutaneous every 24 hours  glucagon  Injectable 1 milliGRAM(s) IntraMuscular once  lactobacillus acidophilus 1 Tablet(s) Oral daily  memantine 10 milliGRAM(s) Oral daily  multivitamin 1 Tablet(s) Oral daily  simvastatin 20 milliGRAM(s) Oral at bedtime  tamsulosin 0.4 milliGRAM(s) Oral at bedtime    MEDICATIONS  (PRN):  acetaminophen     Tablet .. 650 milliGRAM(s) Oral every 6 hours PRN Temp greater or equal to 38C (100.4F)  dextrose Oral Gel 15 Gram(s) Oral once PRN Blood Glucose LESS THAN 70 milliGRAM(s)/deciliter    Pertinent Labs: 09-15 Na139 mmol/L Glu 120 mg/dL<H> K+ 4.4 mmol/L Cr  1.48 mg/dL<H> BUN 26 mg/dL<H> 09-15 Phos 3.0 mg/dL  09-07-22  A1C 6.7%; 09-15 POCT: 145, 156, 183, 144    Skin:  WDL; no pressure ulcers noted    Estimated Needs:   [ X] no change since previous assessment (9/13)  [ ] recalculated:     Previous Nutrition Diagnosis:   [x ]   Severe Malnutrition in context of chronic illness  Etiology:  Inadequate energy/protein intake related to dementia  Signs & Symptoms:  physical findings of severe muscle wasting as noted on 9/13; <75% nutrition needs >1 month    GOAL:  Pt to consume >50% meals/supplements - not met; maintain wt - met at this time    Nutrition Diagnosis is [X ] ongoing  [ ] resolved [ ] not applicable     New Nutrition Diagnosis: [X ] not applicable      Interventions:   continue current diet rx as noted  Recommend  [ ] Change Diet To:  [ ] Nutrition Supplement  [ ] Nutrition Support  [ ] Other:     Monitoring and Evaluation:   [X ] PO intake [ x ] Tolerance to diet prescription [ x ] weights [ x ] labs[ x ] follow up per protocol  [ ] other:

## 2022-09-16 NOTE — PROGRESS NOTE ADULT - ASSESSMENT
Patient is an 87M with a PMH of dementia, HTN, DM, HLD, BPH who presents to the ED for AMS.  Patient currently AAOx0, unable to provide history.  Spoke to wife - Leon Rose - over the phone.  Per wife patient has had cough for the last 7 days, noted to have fever over the last two days.  At baseline, patient is confused and unable to communicate however is ambulatory with a walker.  For the last 4 days, patient has been unable to ambulate.  Wife concerned because patient's HHA is sick as well and she cannot take care of him at home alone.  Patient has received 3 COVID vaccinations (last in 12/21.)  Vitals stable, labs benign.  Will admit to med surg.  
Patient is an 87M with a PMH of dementia, HTN, DM, HLD, BPH who presents to the ED for AMS.  Patient currently AAOx0, unable to provide history.  Spoke to wife - Leon Rose - over the phone.  Per wife patient has had cough for the last 7 days, noted to have fever over the last two days.  At baseline, patient is confused and unable to communicate however is ambulatory with a walker.  For the last 4 days, patient has been unable to ambulate.  Wife concerned because patient's HHA is sick as well and she cannot take care of him at home alone.  Patient has received 3 COVID vaccinations (last in 12/21.)  Vitals stable, labs benign.  Will admit to med surg.        9/14 tachycardia 150s and low BP d/t intravascular volume depletion from diarrhea and poor PO intake, possibly COVID-19 as well   diarrhea most likely Antibiotic induced. doubt c.diff.   stool now semiformed today for the first time per RN  no brbpr or melena per staff   patient afebrile and nontoxic appearing   s/p2L IVF HR normalized and BP improved   repeat labs in AM   
Patient is an 87M with a PMH of dementia, HTN, DM, HLD, BPH who presents to the ED for AMS.  Patient currently AAOx0, unable to provide history.  Spoke to wife - Leon Rose - over the phone.  Per wife patient has had cough for the last 7 days, noted to have fever over the last two days.  At baseline, patient is confused and unable to communicate however is ambulatory with a walker.  For the last 4 days, patient has been unable to ambulate.  Wife concerned because patient's HHA is sick as well and she cannot take care of him at home alone.  Patient has received 3 COVID vaccinations (last in 12/21.)  Vitals stable, labs benign.  Will admit to med surg.        9/14 tachycardia 150s and low BP d/t intravascular volume depletion from diarrhea and poor PO intake, possibly COVID-19 as well   diarrhea most likely Antibiotic induced. doubt c.diff.   stool now semiformed today for the first time per RN  no brbpr or melena per staff   patient afebrile and nontoxic appearing   s/p2L IVF HR normalized and BP improved   repeat labs in AM   9/15 Vitals stables.   diarrhea resolved. 2 semiformed stools today   
Patient is an 87M with a PMH of dementia, HTN, DM, HLD, BPH who presents to the ED for AMS.  Patient currently AAOx0, unable to provide history.  Spoke to wife - Leon Rose - over the phone.  Per wife patient has had cough for the last 7 days, noted to have fever over the last two days.  At baseline, patient is confused and unable to communicate however is ambulatory with a walker.  For the last 4 days, patient has been unable to ambulate.  Wife concerned because patient's HHA is sick as well and she cannot take care of him at home alone.  Patient has received 3 COVID vaccinations (last in 12/21.)  Vitals stable, labs benign.  Will admit to med surg.  
Patient is an 87M with a PMH of dementia, HTN, DM, HLD, BPH who presents to the ED for AMS.  Patient currently AAOx0, unable to provide history.  Spoke to wife - Leon Rose - over the phone.  Per wife patient has had cough for the last 7 days, noted to have fever over the last two days.  At baseline, patient is confused and unable to communicate however is ambulatory with a walker.  For the last 4 days, patient has been unable to ambulate.  Wife concerned because patient's HHA is sick as well and she cannot take care of him at home alone.  Patient has received 3 COVID vaccinations (last in 12/21.)  Vitals stable, labs benign.  Will admit to med surg.        9/14 tachycardia 150s and low BP d/t intravascular volume depletion from diarrhea and poor PO intake, possibly COVID-19 as well   diarrhea most likely Antibiotic induced. doubt c.diff.   stool now semiformed today for the first time per RN  no brbpr or melena per staff   patient afebrile and nontoxic appearing   s/p2L IVF HR normalized and BP improved   repeat labs in AM   9/15 Vitals stables.   diarrhea resolved. 2 semiformed stools today   9/16 low grade fever and mild leukocytosis   obtained UA, UCx, blood Cx, CXR, and KUB    patient doing well on room air   
Patient is an 87M with a PMH of dementia, HTN, DM, HLD, BPH who presents to the ED for AMS.  Patient currently AAOx0, unable to provide history.  Spoke to wife - Leon Rose - over the phone.  Per wife patient has had cough for the last 7 days, noted to have fever over the last two days.  At baseline, patient is confused and unable to communicate however is ambulatory with a walker.  For the last 4 days, patient has been unable to ambulate.  Wife concerned because patient's HHA is sick as well and she cannot take care of him at home alone.  Patient has received 3 COVID vaccinations (last in 12/21.)  Vitals stable, labs benign.  Will admit to med surg.  
Patient is an 87M with a PMH of dementia, HTN, DM, HLD, BPH who presents to the ED for AMS.  Patient currently AAOx0, unable to provide history.  Spoke to wife - Leon Rose - over the phone.  Per wife patient has had cough for the last 7 days, noted to have fever over the last two days.  At baseline, patient is confused and unable to communicate however is ambulatory with a walker.  For the last 4 days, patient has been unable to ambulate.  Wife concerned because patient's HHA is sick as well and she cannot take care of him at home alone.  Patient has received 3 COVID vaccinations (last in 12/21.)  Vitals stable, labs benign.  Will admit to med surg.

## 2022-09-16 NOTE — PROGRESS NOTE ADULT - PROBLEM SELECTOR PLAN 7
simvastatin

## 2022-09-16 NOTE — PROGRESS NOTE ADULT - REASON FOR ADMISSION
COVID, AMS

## 2022-09-16 NOTE — PROGRESS NOTE ADULT - PROBLEM SELECTOR PROBLEM 4
BPH without urinary obstruction

## 2022-09-16 NOTE — PROGRESS NOTE ADULT - PROBLEM SELECTOR PLAN 8
lovenox SQ-dvt ppx  fall, aspiration precautions   HOBE

## 2022-09-17 ENCOUNTER — TRANSCRIPTION ENCOUNTER (OUTPATIENT)
Age: 87
End: 2022-09-17

## 2022-09-17 VITALS
HEART RATE: 77 BPM | TEMPERATURE: 100 F | RESPIRATION RATE: 17 BRPM | DIASTOLIC BLOOD PRESSURE: 87 MMHG | OXYGEN SATURATION: 96 % | SYSTOLIC BLOOD PRESSURE: 137 MMHG

## 2022-09-17 LAB
ANION GAP SERPL CALC-SCNC: 8 MMOL/L — SIGNIFICANT CHANGE UP (ref 5–17)
BASOPHILS # BLD AUTO: 0.02 K/UL — SIGNIFICANT CHANGE UP (ref 0–0.2)
BASOPHILS NFR BLD AUTO: 0.2 % — SIGNIFICANT CHANGE UP (ref 0–2)
BUN SERPL-MCNC: 22 MG/DL — SIGNIFICANT CHANGE UP (ref 7–23)
CALCIUM SERPL-MCNC: 9.2 MG/DL — SIGNIFICANT CHANGE UP (ref 8.5–10.1)
CHLORIDE SERPL-SCNC: 113 MMOL/L — HIGH (ref 96–108)
CO2 SERPL-SCNC: 22 MMOL/L — SIGNIFICANT CHANGE UP (ref 22–31)
CREAT SERPL-MCNC: 1.19 MG/DL — SIGNIFICANT CHANGE UP (ref 0.5–1.3)
CULTURE RESULTS: SIGNIFICANT CHANGE UP
EGFR: 60 ML/MIN/1.73M2 — SIGNIFICANT CHANGE UP
EOSINOPHIL # BLD AUTO: 0 K/UL — SIGNIFICANT CHANGE UP (ref 0–0.5)
EOSINOPHIL NFR BLD AUTO: 0 % — SIGNIFICANT CHANGE UP (ref 0–6)
GLUCOSE SERPL-MCNC: 108 MG/DL — HIGH (ref 70–99)
HCT VFR BLD CALC: 38.9 % — LOW (ref 39–50)
HGB BLD-MCNC: 12 G/DL — LOW (ref 13–17)
IMM GRANULOCYTES NFR BLD AUTO: 0.6 % — SIGNIFICANT CHANGE UP (ref 0–0.9)
LYMPHOCYTES # BLD AUTO: 1.56 K/UL — SIGNIFICANT CHANGE UP (ref 1–3.3)
LYMPHOCYTES # BLD AUTO: 17.7 % — SIGNIFICANT CHANGE UP (ref 13–44)
MAGNESIUM SERPL-MCNC: 2.2 MG/DL — SIGNIFICANT CHANGE UP (ref 1.6–2.6)
MCHC RBC-ENTMCNC: 25.7 PG — LOW (ref 27–34)
MCHC RBC-ENTMCNC: 30.8 G/DL — LOW (ref 32–36)
MCV RBC AUTO: 83.3 FL — SIGNIFICANT CHANGE UP (ref 80–100)
MONOCYTES # BLD AUTO: 0.8 K/UL — SIGNIFICANT CHANGE UP (ref 0–0.9)
MONOCYTES NFR BLD AUTO: 9.1 % — SIGNIFICANT CHANGE UP (ref 2–14)
NEUTROPHILS # BLD AUTO: 6.36 K/UL — SIGNIFICANT CHANGE UP (ref 1.8–7.4)
NEUTROPHILS NFR BLD AUTO: 72.4 % — SIGNIFICANT CHANGE UP (ref 43–77)
NRBC # BLD: 0 /100 WBCS — SIGNIFICANT CHANGE UP (ref 0–0)
PHOSPHATE SERPL-MCNC: 2.9 MG/DL — SIGNIFICANT CHANGE UP (ref 2.5–4.5)
PLATELET # BLD AUTO: 218 K/UL — SIGNIFICANT CHANGE UP (ref 150–400)
POTASSIUM SERPL-MCNC: 4.6 MMOL/L — SIGNIFICANT CHANGE UP (ref 3.5–5.3)
POTASSIUM SERPL-SCNC: 4.6 MMOL/L — SIGNIFICANT CHANGE UP (ref 3.5–5.3)
RBC # BLD: 4.67 M/UL — SIGNIFICANT CHANGE UP (ref 4.2–5.8)
RBC # FLD: 14.5 % — SIGNIFICANT CHANGE UP (ref 10.3–14.5)
SODIUM SERPL-SCNC: 143 MMOL/L — SIGNIFICANT CHANGE UP (ref 135–145)
SPECIMEN SOURCE: SIGNIFICANT CHANGE UP
WBC # BLD: 8.79 K/UL — SIGNIFICANT CHANGE UP (ref 3.8–10.5)
WBC # FLD AUTO: 8.79 K/UL — SIGNIFICANT CHANGE UP (ref 3.8–10.5)

## 2022-09-17 PROCEDURE — 99239 HOSP IP/OBS DSCHRG MGMT >30: CPT

## 2022-09-17 RX ORDER — AMLODIPINE BESYLATE 2.5 MG/1
1 TABLET ORAL
Qty: 0 | Refills: 0 | DISCHARGE

## 2022-09-17 RX ORDER — AMLODIPINE BESYLATE 2.5 MG/1
1 TABLET ORAL
Qty: 0 | Refills: 0 | DISCHARGE
Start: 2022-09-17

## 2022-09-17 RX ORDER — ACETAMINOPHEN 500 MG
2 TABLET ORAL
Qty: 0 | Refills: 0 | DISCHARGE
Start: 2022-09-17

## 2022-09-17 RX ORDER — LACTOBACILLUS ACIDOPHILUS 100MM CELL
1 CAPSULE ORAL
Qty: 0 | Refills: 0 | DISCHARGE
Start: 2022-09-17

## 2022-09-17 RX ORDER — PANTOPRAZOLE SODIUM 20 MG/1
40 TABLET, DELAYED RELEASE ORAL
Refills: 0 | Status: DISCONTINUED | OUTPATIENT
Start: 2022-09-17 | End: 2022-09-17

## 2022-09-17 RX ORDER — DONEPEZIL HYDROCHLORIDE 10 MG/1
1 TABLET, FILM COATED ORAL
Qty: 0 | Refills: 0 | DISCHARGE

## 2022-09-17 RX ORDER — REPAGLINIDE 1 MG/1
1 TABLET ORAL
Qty: 0 | Refills: 0 | DISCHARGE

## 2022-09-17 RX ORDER — SIMVASTATIN 20 MG/1
1 TABLET, FILM COATED ORAL
Qty: 0 | Refills: 0 | DISCHARGE

## 2022-09-17 RX ORDER — TAMSULOSIN HYDROCHLORIDE 0.4 MG/1
1 CAPSULE ORAL
Qty: 0 | Refills: 0 | DISCHARGE
Start: 2022-09-17

## 2022-09-17 RX ORDER — PANTOPRAZOLE SODIUM 20 MG/1
1 TABLET, DELAYED RELEASE ORAL
Qty: 0 | Refills: 0 | DISCHARGE
Start: 2022-09-17

## 2022-09-17 RX ORDER — DONEPEZIL HYDROCHLORIDE 10 MG/1
1 TABLET, FILM COATED ORAL
Qty: 0 | Refills: 0 | DISCHARGE
Start: 2022-09-17

## 2022-09-17 RX ORDER — TAMSULOSIN HYDROCHLORIDE 0.4 MG/1
1 CAPSULE ORAL
Qty: 0 | Refills: 0 | DISCHARGE

## 2022-09-17 RX ORDER — SIMVASTATIN 20 MG/1
1 TABLET, FILM COATED ORAL
Qty: 0 | Refills: 0 | DISCHARGE
Start: 2022-09-17

## 2022-09-17 RX ORDER — AMLODIPINE BESYLATE 2.5 MG/1
5 TABLET ORAL DAILY
Refills: 0 | Status: DISCONTINUED | OUTPATIENT
Start: 2022-09-17 | End: 2022-09-17

## 2022-09-17 RX ADMIN — Medication 30 MILLILITER(S): at 12:52

## 2022-09-17 RX ADMIN — Medication 1 TABLET(S): at 12:50

## 2022-09-17 RX ADMIN — PANTOPRAZOLE SODIUM 40 MILLIGRAM(S): 20 TABLET, DELAYED RELEASE ORAL at 12:51

## 2022-09-17 RX ADMIN — SODIUM CHLORIDE 50 MILLILITER(S): 9 INJECTION, SOLUTION INTRAVENOUS at 12:52

## 2022-09-17 RX ADMIN — ENOXAPARIN SODIUM 40 MILLIGRAM(S): 100 INJECTION SUBCUTANEOUS at 12:50

## 2022-09-17 RX ADMIN — MEMANTINE HYDROCHLORIDE 10 MILLIGRAM(S): 10 TABLET ORAL at 12:50

## 2022-09-17 RX ADMIN — AMLODIPINE BESYLATE 5 MILLIGRAM(S): 2.5 TABLET ORAL at 14:56

## 2022-09-17 NOTE — DISCHARGE NOTE PROVIDER - NSDCMRMEDTOKEN_GEN_ALL_CORE_FT
acetaminophen 325 mg oral tablet: 2 tab(s) orally every 6 hours, As needed,   amLODIPine 5 mg oral tablet: 1 tab(s) orally once a day  donepezil 10 mg oral tablet: 1 tab(s) orally once a day (at bedtime)  lactobacillus acidophilus oral capsule: 1 tab(s) orally 2 times a day  memantine 10 mg oral tablet: 1 tab(s) orally 2 times a day  Multiple Vitamins oral tablet: 1 tab(s) orally once a day  pantoprazole 40 mg oral delayed release tablet: 1 tab(s) orally once a day (before a meal)  simvastatin 20 mg oral tablet: 1 tab(s) orally once a day (at bedtime)  tamsulosin 0.4 mg oral capsule: 1 cap(s) orally once a day (at bedtime)   acetaminophen 325 mg oral tablet: 2 tab(s) orally every 6 hours, As needed,   amLODIPine 5 mg oral tablet: 1 tab(s) orally once a day  donepezil 10 mg oral tablet: 1 tab(s) orally once a day (at bedtime)  lactobacillus acidophilus oral capsule: 1 tab(s) orally 2 times a day  Lovenox 40 mg/0.4 mL injectable solution: 40 milligram(s) subcutaneous once a day for 30 days for DVT PROPHYLAXIS IN SETTING OF COVID-19, PATIENT &gt;59YO AND HAS ELEVATED D-DIMER  memantine 10 mg oral tablet: 1 tab(s) orally 2 times a day  Multiple Vitamins oral tablet: 1 tab(s) orally once a day  pantoprazole 40 mg oral delayed release tablet: 1 tab(s) orally once a day (before a meal)  simvastatin 20 mg oral tablet: 1 tab(s) orally once a day (at bedtime)  tamsulosin 0.4 mg oral capsule: 1 cap(s) orally once a day (at bedtime)

## 2022-09-17 NOTE — CHART NOTE - NSCHARTNOTEFT_GEN_A_CORE
IMPROVE VTE Individual Risk Assessment    RISK                                                                Points    [  ] Previous VTE                                                  3    [  ] Thrombophilia                                               2    [  ] Lower limb paralysis                                      2        (unable to hold up >15 seconds)      [  ] Current Cancer                                              2         (within 6 months)    [x  ] Immobilization > 24 hrs                                1    [  ] ICU/CCU stay > 24 hours                              1    [x  ] Age > 60                                                      1    IMPROVE VTE Score __2_______    IMPROVE Score 0-1: Low Risk, No VTE prophylaxis required for most patients, encourage ambulation.   IMPROVE Score 2-3: At risk, pharmacologic VTE prophylaxis is indicated for most patients (in the absence of a contraindication)  IMPROVE Score > or = 4: High Risk, pharmacologic VTE prophylaxis is indicated for most patients (in the absence of a contraindication)

## 2022-09-17 NOTE — DISCHARGE NOTE NURSING/CASE MANAGEMENT/SOCIAL WORK - NSDCFUADDAPPT_GEN_ALL_CORE_FT
It is important to see your primary physician as well as other necessary consultants within the next week to perform a comprehensive medical review.  Call their offices for an appointment as soon as you leave the hospital.  If you do not have a primary physician or cant reach him/her, contact the [facility] Physician Referral Service at [phone].  Your medical issues appear to be stable at this time, but if your symptoms recur or worsen, contact your physicians and/or return to the hospital if necessary.  If you encounter any issues or questions with your medication, call your physicians before stopping the medication.

## 2022-09-17 NOTE — DISCHARGE NOTE PROVIDER - DETAILS OF MALNUTRITION DIAGNOSIS/DIAGNOSES
This patient has been assessed with a concern for Malnutrition and was treated during this hospitalization for the following Nutrition diagnosis/diagnoses:     -  09/13/2022: Severe protein-calorie malnutrition

## 2022-09-17 NOTE — DISCHARGE NOTE NURSING/CASE MANAGEMENT/SOCIAL WORK - NSDCPEFALRISK_GEN_ALL_CORE
For information on Fall & Injury Prevention, visit: [email or web address] OR  [email or web address] OR  https://www.cdc.gov/steadi/patient.html

## 2022-09-17 NOTE — DISCHARGE NOTE PROVIDER - HOSPITAL COURSE
87M with a PMH of dementia, HTN, DM, HLD, BPH who presents to the ED for AMS.  Patient currently AAOx0, unable to provide history.  Spoke to wife - Leon Rose - over the phone.  Per wife patient has had cough for the last 7 days, noted to have fever over the last two days.  At baseline, patient is confused and unable to communicate however is ambulatory with a walker.  For the last 4 days, patient has been unable to ambulate.  Wife concerned because patient's HHA is sick as well and she cannot take care of him at home alone.  Patient has received 3 COVID vaccinations (last in 12/21.)      Vital Signs Last 24 Hrs  T(C): 37.6 (17 Sep 2022 11:44), Max: 37.6 (16 Sep 2022 23:25)  T(F): 99.6 (17 Sep 2022 11:44), Max: 99.7 (16 Sep 2022 23:25)  HR: 88 (17 Sep 2022 11:44) (67 - 90)  BP: 148/83 (17 Sep 2022 11:44) (130/74 - 148/83)  BP(mean): --  RR: 18 (17 Sep 2022 11:44) (17 - 18)  SpO2: 98% (17 Sep 2022 11:44) (96% - 98%)    GENERAL: NAD, no increased WOB   HEAD:  Atraumatic, Normocephalic  EYES: EOMI, PERRLA, conjunctiva and sclera clear  NECK: Supple   NERVOUS SYSTEM: awake, nonverbal   CHEST/LUNG: CTAB  HEART: Regular rate and rhythm; No murmurs, rubs, or gallops  ABDOMEN: Soft, Nontender, Nondistended; Bowel sounds present  EXTREMITIES:  2+ Peripheral Pulses b/l,  No edema b/l LE       9/14 tachycardia 150s and low BP d/t intravascular volume depletion from diarrhea and poor PO intake, possibly COVID-19 as well   diarrhea most likely Antibiotic induced. doubt c.diff.   stool now semiformed today for the first time per RN  no brbpr or melena per staff   patient afebrile and nontoxic appearing   s/p2L IVF HR normalized and BP improved   repeat labs in AM   9/15 Vitals stables.   diarrhea resolved. 2 semiformed stools today   9/16 low grade fever and mild leukocytosis   obtained UA, UCx, blood Cx, CXR, and KUB    patient doing well on room air   9/17 afebrile today, WBC normalized off abx , clinically patient appears nontoxic . CXR and KUB (read myself) appear unremarkable (awaiting official read). Blood and urine Cx are pending. [facility] be [facility] up at [facility].   bladder scan PVR <75cc     # 2019 novel coronavirus disease (COVID-19).   ·  Plan: COVID 19 swabbed in ED - +ve results.  Isolation precautions  did not need  dexamethasone and remdesivir as patient  not requiring supplemental oxygen  patient remains on room air.    # UTI (urinary tract infection).   ·  Plan: WBCs and leuk esterase in urine  completed course of abx.    #RADHA POA, resolved with IVF     # Diabetes mellitus.   ·  Plan: A1c 6.7%  on repaglinide 0.5mg bid at home. recommend to stop as patient at risk for hypoglycemia   too tight glucose control has shown to increase mortality and morbidity.   can be monitored outpatient, and if FS become uncontrolled or if patient's appetite improves , outpatient provider can consider restarting       Problem/Plan - 4:  ·  Problem: BPH without urinary obstruction.   ·  Plan: tamsulosin  bladder scan PVR 3cc.    Problem/Plan - 5:  ·  Problem: Alzheimer's dementia.   ·  Plan: memantine, donepezil  supportive care   frequent repositioning to prevent pressure wounds.    Problem/Plan - 6:  ·  Problem: Essential hypertension.   ·  Plan: amlodipine 5mg daily resumed     Problem/Plan - 7:  ·  Problem: Hyperlipidemia.   ·  Plan: simvastatin.        Care plan and all findings were discussed in detail with patient and wife Leon [phone].  All questions and concerns addressed.     Discharge time : 40 min   RETURN PARAMETERS DISCUSSED WITH PATIENT and wife, They EXPRESSED UNDERSTANDING AND IS AGREEABLE. DISCUSSED WITH PATIENT ON REFRAINING FROM DRIVING UNTIL FOLLOW-UP/ CLEARED BY PMD. PATIENT EXPRESSED UNDERSTANDING.  87M with a PMH of dementia, HTN, DM, HLD, BPH who presents to the ED for AMS.  Patient currently AAOx0, unable to provide history.  Spoke to wife - Leon Rose - over the phone.  Per wife patient has had cough for the last 7 days, noted to have fever over the last two days.  At baseline, patient is confused and unable to communicate however is ambulatory with a walker.  For the last 4 days, patient has been unable to ambulate.  Wife concerned because patient's HHA is sick as well and she cannot take care of him at home alone.  Patient has received 3 COVID vaccinations (last in 12/21.)      Vital Signs Last 24 Hrs  T(C): 37.6 (17 Sep 2022 11:44), Max: 37.6 (16 Sep 2022 23:25)  T(F): 99.6 (17 Sep 2022 11:44), Max: 99.7 (16 Sep 2022 23:25)  HR: 88 (17 Sep 2022 11:44) (67 - 90)  BP: 148/83 (17 Sep 2022 11:44) (130/74 - 148/83)  BP(mean): --  RR: 18 (17 Sep 2022 11:44) (17 - 18)  SpO2: 98% (17 Sep 2022 11:44) (96% - 98%)    GENERAL: NAD, no increased WOB   HEAD:  Atraumatic, Normocephalic  EYES: EOMI, PERRLA, conjunctiva and sclera clear  NECK: Supple   NERVOUS SYSTEM: awake, nonverbal   CHEST/LUNG: CTAB  HEART: Regular rate and rhythm; No murmurs, rubs, or gallops  ABDOMEN: Soft, Nontender, Nondistended; Bowel sounds present  EXTREMITIES:  2+ Peripheral Pulses b/l,  No edema b/l LE       9/14 tachycardia 150s and low BP d/t intravascular volume depletion from diarrhea and poor PO intake, possibly COVID-19 as well   diarrhea most likely Antibiotic induced. doubt c.diff.   stool now semiformed today for the first time per RN  no brbpr or melena per staff   patient afebrile and nontoxic appearing   s/p2L IVF HR normalized and BP improved   repeat labs in AM   9/15 Vitals stables.   diarrhea resolved. 2 semiformed stools today   9/16 low grade fever and mild leukocytosis   obtained UA, UCx, blood Cx, CXR, and KUB    patient doing well on room air   9/17 afebrile today, WBC normalized off abx , clinically patient appears nontoxic . CXR and KUB (read myself) appear unremarkable (awaiting official read). Blood and urine Cx are pending. can be followed up at [facility].   bladder scan PVR <75cc     # 2019 novel coronavirus disease (COVID-19).   ·  Plan: COVID 19 swabbed in ED - +ve results.  Isolation precautions  did not need  dexamethasone and remdesivir as patient  not requiring supplemental oxygen  patient remains on room air.  lovenox 40mg SQ once daily-dvt ppx given Age >61 yo and elevated D-dimer     # UTI (urinary tract infection).   ·  Plan: WBCs and leuk esterase in urine  completed course of abx.    #RADHA POA, resolved with IVF     # Diabetes mellitus.   ·  Plan: A1c 6.7%  on repaglinide 0.5mg bid at home. recommend to stop as patient at risk for hypoglycemia   too tight glucose control has shown to increase mortality and morbidity.   can be monitored outpatient, and if FS become uncontrolled or if patient's appetite improves , outpatient provider can consider restarting       Problem/Plan - 4:  ·  Problem: BPH without urinary obstruction.   ·  Plan: tamsulosin  bladder scan PVR 3cc.    Problem/Plan - 5:  ·  Problem: Alzheimer's dementia.   ·  Plan: memantine, donepezil  supportive care   frequent repositioning to prevent pressure wounds.    Problem/Plan - 6:  ·  Problem: Essential hypertension.   ·  Plan: amlodipine 5mg daily resumed     Problem/Plan - 7:  ·  Problem: Hyperlipidemia.   ·  Plan: simvastatin.        Care plan and all findings were discussed in detail with patient and wife Leon [phone].  All questions and concerns addressed.     Discharge time : 40 min   RETURN PARAMETERS DISCUSSED WITH PATIENT and wife, They EXPRESSED UNDERSTANDING AND IS AGREEABLE. DISCUSSED WITH PATIENT ON REFRAINING FROM DRIVING UNTIL FOLLOW-UP/ CLEARED BY PMD. PATIENT EXPRESSED UNDERSTANDING.

## 2022-09-17 NOTE — DISCHARGE NOTE PROVIDER - NSDCCPCAREPLAN_GEN_ALL_CORE_FT
Render Risk Assessment In Note?: no Detail Level: Simple Comment: Removed by PCP in 99’ Detail Level: Generalized Comment: Father. Amelanotic Comment: Father, BCC and SCC PRINCIPAL DISCHARGE DIAGNOSIS  Diagnosis: 2019 novel coronavirus disease (COVID-19)  Assessment and Plan of Treatment:       SECONDARY DISCHARGE DIAGNOSES  Diagnosis: Essential hypertension  Assessment and Plan of Treatment:     Diagnosis: Diabetes mellitus  Assessment and Plan of Treatment:     Diagnosis: BPH without urinary obstruction  Assessment and Plan of Treatment:     Diagnosis: Alzheimer's dementia  Assessment and Plan of Treatment:     Diagnosis: Acute UTI  Assessment and Plan of Treatment:      PRINCIPAL DISCHARGE DIAGNOSIS  Diagnosis: 2019 novel coronavirus disease (COVID-19)  Assessment and Plan of Treatment: continue Lovenox SQ 40mg once daily for 30 days as dvt ppx      SECONDARY DISCHARGE DIAGNOSES  Diagnosis: Essential hypertension  Assessment and Plan of Treatment:     Diagnosis: Diabetes mellitus  Assessment and Plan of Treatment:     Diagnosis: BPH without urinary obstruction  Assessment and Plan of Treatment:     Diagnosis: Alzheimer's dementia  Assessment and Plan of Treatment:     Diagnosis: Acute UTI  Assessment and Plan of Treatment:

## 2022-09-17 NOTE — DISCHARGE NOTE NURSING/CASE MANAGEMENT/SOCIAL WORK - PATIENT PORTAL LINK FT
You can access the FollowMyHealth Patient Portal offered by [facility] by registering at the following website: [email or web address]. By joining [facility]’s FollowMyHealth portal, you will also be able to view your health information using other applications (apps) compatible with our system.

## 2022-09-17 NOTE — DISCHARGE NOTE PROVIDER - NSDCFUADDAPPT_GEN_ALL_CORE_FT
It is important to see your primary physician as well as other necessary consultants within the next week to perform a comprehensive medical review.  Call their offices for an appointment as soon as you leave the hospital.  If you do not have a primary physician or cant reach him/her, contact the [facility]ician Referral Service at [phone].  Your medical issues appear to be stable at this time, but if your symptoms recur or worsen, contact your physicians and/or return to the hospital if necessary.  If you encounter any issues or questions with your medication, call your physicians before stopping the medication.

## 2022-09-21 LAB
CULTURE RESULTS: SIGNIFICANT CHANGE UP
CULTURE RESULTS: SIGNIFICANT CHANGE UP
SPECIMEN SOURCE: SIGNIFICANT CHANGE UP
SPECIMEN SOURCE: SIGNIFICANT CHANGE UP

## 2022-09-22 DIAGNOSIS — K52.1 TOXIC GASTROENTERITIS AND COLITIS: ICD-10-CM

## 2022-09-22 DIAGNOSIS — I10 ESSENTIAL (PRIMARY) HYPERTENSION: ICD-10-CM

## 2022-09-22 DIAGNOSIS — E78.5 HYPERLIPIDEMIA, UNSPECIFIED: ICD-10-CM

## 2022-09-22 DIAGNOSIS — T36.95XA ADVERSE EFFECT OF UNSPECIFIED SYSTEMIC ANTIBIOTIC, INITIAL ENCOUNTER: ICD-10-CM

## 2022-09-22 DIAGNOSIS — B95.2 ENTEROCOCCUS AS THE CAUSE OF DISEASES CLASSIFIED ELSEWHERE: ICD-10-CM

## 2022-09-22 DIAGNOSIS — R41.82 ALTERED MENTAL STATUS, UNSPECIFIED: ICD-10-CM

## 2022-09-22 DIAGNOSIS — N40.0 BENIGN PROSTATIC HYPERPLASIA WITHOUT LOWER URINARY TRACT SYMPTOMS: ICD-10-CM

## 2022-09-22 DIAGNOSIS — E11.9 TYPE 2 DIABETES MELLITUS WITHOUT COMPLICATIONS: ICD-10-CM

## 2022-09-22 DIAGNOSIS — N39.0 URINARY TRACT INFECTION, SITE NOT SPECIFIED: ICD-10-CM

## 2022-09-22 DIAGNOSIS — U07.1 COVID-19: ICD-10-CM

## 2022-09-22 DIAGNOSIS — E43 UNSPECIFIED SEVERE PROTEIN-CALORIE MALNUTRITION: ICD-10-CM

## 2022-09-22 DIAGNOSIS — E86.9 VOLUME DEPLETION, UNSPECIFIED: ICD-10-CM

## 2022-09-22 DIAGNOSIS — G30.9 ALZHEIMER'S DISEASE, UNSPECIFIED: ICD-10-CM

## 2022-09-22 DIAGNOSIS — F02.80 DEMENTIA IN OTHER DISEASES CLASSIFIED ELSEWHERE, UNSPECIFIED SEVERITY, WITHOUT BEHAVIORAL DISTURBANCE, PSYCHOTIC DISTURBANCE, MOOD DISTURBANCE, AND ANXIETY: ICD-10-CM

## 2022-09-22 DIAGNOSIS — N17.9 ACUTE KIDNEY FAILURE, UNSPECIFIED: ICD-10-CM

## 2022-10-03 ENCOUNTER — INPATIENT (INPATIENT)
Facility: HOSPITAL | Age: 87
LOS: 15 days | Discharge: NURSING, REHAB OR LONG-TERM CARE | End: 2022-10-19
Attending: HOSPITALIST | Admitting: HOSPITALIST

## 2022-10-03 VITALS
WEIGHT: 145.06 LBS | OXYGEN SATURATION: 98 % | SYSTOLIC BLOOD PRESSURE: 113 MMHG | RESPIRATION RATE: 17 BRPM | HEART RATE: 70 BPM | HEIGHT: 71 IN | TEMPERATURE: 98 F | DIASTOLIC BLOOD PRESSURE: 82 MMHG

## 2022-10-03 DIAGNOSIS — N17.9 ACUTE KIDNEY FAILURE, UNSPECIFIED: ICD-10-CM

## 2022-10-03 DIAGNOSIS — R62.7 ADULT FAILURE TO THRIVE: ICD-10-CM

## 2022-10-03 DIAGNOSIS — E87.5 HYPERKALEMIA: ICD-10-CM

## 2022-10-03 DIAGNOSIS — N30.00 ACUTE CYSTITIS WITHOUT HEMATURIA: ICD-10-CM

## 2022-10-03 DIAGNOSIS — G30.9 ALZHEIMER'S DISEASE, UNSPECIFIED: ICD-10-CM

## 2022-10-03 DIAGNOSIS — Z29.9 ENCOUNTER FOR PROPHYLACTIC MEASURES, UNSPECIFIED: ICD-10-CM

## 2022-10-03 DIAGNOSIS — E87.6 HYPOKALEMIA: ICD-10-CM

## 2022-10-03 DIAGNOSIS — I10 ESSENTIAL (PRIMARY) HYPERTENSION: ICD-10-CM

## 2022-10-03 PROBLEM — F03.90 UNSPECIFIED DEMENTIA WITHOUT BEHAVIORAL DISTURBANCE: Chronic | Status: ACTIVE | Noted: 2022-09-06

## 2022-10-03 PROBLEM — E11.9 TYPE 2 DIABETES MELLITUS WITHOUT COMPLICATIONS: Chronic | Status: ACTIVE | Noted: 2022-09-06

## 2022-10-03 PROBLEM — E78.00 PURE HYPERCHOLESTEROLEMIA, UNSPECIFIED: Chronic | Status: ACTIVE | Noted: 2022-09-06

## 2022-10-03 LAB
ALBUMIN SERPL ELPH-MCNC: 2.8 G/DL — LOW (ref 3.3–5)
ALP SERPL-CCNC: 79 U/L — SIGNIFICANT CHANGE UP (ref 40–120)
ALT FLD-CCNC: 56 U/L — SIGNIFICANT CHANGE UP (ref 12–78)
ANION GAP SERPL CALC-SCNC: 16 MMOL/L — SIGNIFICANT CHANGE UP (ref 5–17)
APPEARANCE UR: ABNORMAL
AST SERPL-CCNC: 25 U/L — SIGNIFICANT CHANGE UP (ref 15–37)
BACTERIA # UR AUTO: ABNORMAL
BASOPHILS # BLD AUTO: 0.02 K/UL — SIGNIFICANT CHANGE UP (ref 0–0.2)
BASOPHILS NFR BLD AUTO: 0.2 % — SIGNIFICANT CHANGE UP (ref 0–2)
BILIRUB SERPL-MCNC: 0.2 MG/DL — SIGNIFICANT CHANGE UP (ref 0.2–1.2)
BILIRUB UR-MCNC: NEGATIVE — SIGNIFICANT CHANGE UP
BUN SERPL-MCNC: 71 MG/DL — HIGH (ref 7–23)
CALCIUM SERPL-MCNC: 9.4 MG/DL — SIGNIFICANT CHANGE UP (ref 8.5–10.1)
CHLORIDE SERPL-SCNC: 125 MMOL/L — HIGH (ref 96–108)
CO2 SERPL-SCNC: 14 MMOL/L — LOW (ref 22–31)
COLOR SPEC: YELLOW — SIGNIFICANT CHANGE UP
CREAT SERPL-MCNC: 2.68 MG/DL — HIGH (ref 0.5–1.3)
DIFF PNL FLD: ABNORMAL
EGFR: 23 ML/MIN/1.73M2 — LOW
EOSINOPHIL # BLD AUTO: 0 K/UL — SIGNIFICANT CHANGE UP (ref 0–0.5)
EOSINOPHIL NFR BLD AUTO: 0 % — SIGNIFICANT CHANGE UP (ref 0–6)
EPI CELLS # UR: SIGNIFICANT CHANGE UP
FLUAV AG NPH QL: SIGNIFICANT CHANGE UP
FLUBV AG NPH QL: SIGNIFICANT CHANGE UP
GLUCOSE BLDC GLUCOMTR-MCNC: 135 MG/DL — HIGH (ref 70–99)
GLUCOSE SERPL-MCNC: 194 MG/DL — HIGH (ref 70–99)
GLUCOSE UR QL: NEGATIVE MG/DL — SIGNIFICANT CHANGE UP
HCT VFR BLD CALC: 52.3 % — HIGH (ref 39–50)
HGB BLD-MCNC: 15.5 G/DL — SIGNIFICANT CHANGE UP (ref 13–17)
IMM GRANULOCYTES NFR BLD AUTO: 0.4 % — SIGNIFICANT CHANGE UP (ref 0–0.9)
KETONES UR-MCNC: ABNORMAL
LEUKOCYTE ESTERASE UR-ACNC: ABNORMAL
LYMPHOCYTES # BLD AUTO: 1.67 K/UL — SIGNIFICANT CHANGE UP (ref 1–3.3)
LYMPHOCYTES # BLD AUTO: 14.4 % — SIGNIFICANT CHANGE UP (ref 13–44)
MAGNESIUM SERPL-MCNC: 3.4 MG/DL — HIGH (ref 1.6–2.6)
MCHC RBC-ENTMCNC: 25.4 PG — LOW (ref 27–34)
MCHC RBC-ENTMCNC: 29.6 G/DL — LOW (ref 32–36)
MCV RBC AUTO: 85.6 FL — SIGNIFICANT CHANGE UP (ref 80–100)
MONOCYTES # BLD AUTO: 0.5 K/UL — SIGNIFICANT CHANGE UP (ref 0–0.9)
MONOCYTES NFR BLD AUTO: 4.3 % — SIGNIFICANT CHANGE UP (ref 2–14)
NEUTROPHILS # BLD AUTO: 9.37 K/UL — HIGH (ref 1.8–7.4)
NEUTROPHILS NFR BLD AUTO: 80.7 % — HIGH (ref 43–77)
NITRITE UR-MCNC: NEGATIVE — SIGNIFICANT CHANGE UP
NRBC # BLD: 0 /100 WBCS — SIGNIFICANT CHANGE UP (ref 0–0)
PH UR: 5 — SIGNIFICANT CHANGE UP (ref 5–8)
PLATELET # BLD AUTO: 237 K/UL — SIGNIFICANT CHANGE UP (ref 150–400)
POTASSIUM SERPL-MCNC: 5.2 MMOL/L — SIGNIFICANT CHANGE UP (ref 3.5–5.3)
POTASSIUM SERPL-SCNC: 5.2 MMOL/L — SIGNIFICANT CHANGE UP (ref 3.5–5.3)
PROT SERPL-MCNC: 7.9 GM/DL — SIGNIFICANT CHANGE UP (ref 6–8.3)
PROT UR-MCNC: 100 MG/DL
RBC # BLD: 6.11 M/UL — HIGH (ref 4.2–5.8)
RBC # FLD: 17.3 % — HIGH (ref 10.3–14.5)
RBC CASTS # UR COMP ASSIST: >50 /HPF (ref 0–4)
SARS-COV-2 RNA SPEC QL NAA+PROBE: DETECTED
SODIUM SERPL-SCNC: 155 MMOL/L — HIGH (ref 135–145)
SP GR SPEC: 1.02 — SIGNIFICANT CHANGE UP (ref 1.01–1.02)
UROBILINOGEN FLD QL: NEGATIVE MG/DL — SIGNIFICANT CHANGE UP
WBC # BLD: 11.61 K/UL — HIGH (ref 3.8–10.5)
WBC # FLD AUTO: 11.61 K/UL — HIGH (ref 3.8–10.5)
WBC UR QL: >50

## 2022-10-03 PROCEDURE — 93010 ELECTROCARDIOGRAM REPORT: CPT

## 2022-10-03 PROCEDURE — 99222 1ST HOSP IP/OBS MODERATE 55: CPT

## 2022-10-03 PROCEDURE — 99285 EMERGENCY DEPT VISIT HI MDM: CPT

## 2022-10-03 PROCEDURE — 71045 X-RAY EXAM CHEST 1 VIEW: CPT | Mod: 26

## 2022-10-03 RX ORDER — PANTOPRAZOLE SODIUM 20 MG/1
40 TABLET, DELAYED RELEASE ORAL
Refills: 0 | Status: DISCONTINUED | OUTPATIENT
Start: 2022-10-03 | End: 2022-10-08

## 2022-10-03 RX ORDER — MEMANTINE HYDROCHLORIDE 10 MG/1
10 TABLET ORAL
Refills: 0 | Status: DISCONTINUED | OUTPATIENT
Start: 2022-10-03 | End: 2022-10-19

## 2022-10-03 RX ORDER — SIMVASTATIN 20 MG/1
20 TABLET, FILM COATED ORAL AT BEDTIME
Refills: 0 | Status: DISCONTINUED | OUTPATIENT
Start: 2022-10-03 | End: 2022-10-19

## 2022-10-03 RX ORDER — CEFTRIAXONE 500 MG/1
1000 INJECTION, POWDER, FOR SOLUTION INTRAMUSCULAR; INTRAVENOUS EVERY 24 HOURS
Refills: 0 | Status: COMPLETED | OUTPATIENT
Start: 2022-10-04 | End: 2022-10-06

## 2022-10-03 RX ORDER — CEFTRIAXONE 500 MG/1
1000 INJECTION, POWDER, FOR SOLUTION INTRAMUSCULAR; INTRAVENOUS EVERY 24 HOURS
Refills: 0 | Status: DISCONTINUED | OUTPATIENT
Start: 2022-10-04 | End: 2022-10-03

## 2022-10-03 RX ORDER — SODIUM CHLORIDE 9 MG/ML
1000 INJECTION INTRAMUSCULAR; INTRAVENOUS; SUBCUTANEOUS ONCE
Refills: 0 | Status: COMPLETED | OUTPATIENT
Start: 2022-10-03 | End: 2022-10-03

## 2022-10-03 RX ORDER — HEPARIN SODIUM 5000 [USP'U]/ML
5000 INJECTION INTRAVENOUS; SUBCUTANEOUS EVERY 12 HOURS
Refills: 0 | Status: DISCONTINUED | OUTPATIENT
Start: 2022-10-03 | End: 2022-10-10

## 2022-10-03 RX ORDER — CEFTRIAXONE 500 MG/1
1000 INJECTION, POWDER, FOR SOLUTION INTRAMUSCULAR; INTRAVENOUS ONCE
Refills: 0 | Status: COMPLETED | OUTPATIENT
Start: 2022-10-03 | End: 2022-10-03

## 2022-10-03 RX ORDER — DONEPEZIL HYDROCHLORIDE 10 MG/1
10 TABLET, FILM COATED ORAL AT BEDTIME
Refills: 0 | Status: DISCONTINUED | OUTPATIENT
Start: 2022-10-03 | End: 2022-10-19

## 2022-10-03 RX ORDER — ENOXAPARIN SODIUM 100 MG/ML
40 INJECTION SUBCUTANEOUS
Qty: 0 | Refills: 0 | DISCHARGE

## 2022-10-03 RX ORDER — SODIUM CHLORIDE 9 MG/ML
1000 INJECTION, SOLUTION INTRAVENOUS
Refills: 0 | Status: DISCONTINUED | OUTPATIENT
Start: 2022-10-03 | End: 2022-10-04

## 2022-10-03 RX ORDER — LACTOBACILLUS ACIDOPHILUS 100MM CELL
1 CAPSULE ORAL DAILY
Refills: 0 | Status: DISCONTINUED | OUTPATIENT
Start: 2022-10-03 | End: 2022-10-19

## 2022-10-03 RX ORDER — AMLODIPINE BESYLATE 2.5 MG/1
5 TABLET ORAL DAILY
Refills: 0 | Status: DISCONTINUED | OUTPATIENT
Start: 2022-10-03 | End: 2022-10-04

## 2022-10-03 RX ORDER — TAMSULOSIN HYDROCHLORIDE 0.4 MG/1
0.4 CAPSULE ORAL AT BEDTIME
Refills: 0 | Status: DISCONTINUED | OUTPATIENT
Start: 2022-10-03 | End: 2022-10-19

## 2022-10-03 RX ORDER — LANOLIN ALCOHOL/MO/W.PET/CERES
3 CREAM (GRAM) TOPICAL AT BEDTIME
Refills: 0 | Status: DISCONTINUED | OUTPATIENT
Start: 2022-10-03 | End: 2022-10-19

## 2022-10-03 RX ADMIN — SODIUM CHLORIDE 75 MILLILITER(S): 9 INJECTION, SOLUTION INTRAVENOUS at 22:49

## 2022-10-03 RX ADMIN — TAMSULOSIN HYDROCHLORIDE 0.4 MILLIGRAM(S): 0.4 CAPSULE ORAL at 22:48

## 2022-10-03 RX ADMIN — CEFTRIAXONE 100 MILLIGRAM(S): 500 INJECTION, POWDER, FOR SOLUTION INTRAMUSCULAR; INTRAVENOUS at 16:14

## 2022-10-03 RX ADMIN — SIMVASTATIN 20 MILLIGRAM(S): 20 TABLET, FILM COATED ORAL at 22:00

## 2022-10-03 RX ADMIN — DONEPEZIL HYDROCHLORIDE 10 MILLIGRAM(S): 10 TABLET, FILM COATED ORAL at 22:49

## 2022-10-03 RX ADMIN — SODIUM CHLORIDE 1000 MILLILITER(S): 9 INJECTION INTRAMUSCULAR; INTRAVENOUS; SUBCUTANEOUS at 15:50

## 2022-10-03 NOTE — H&P ADULT - HISTORY OF PRESENT ILLNESS
88 y/o M pmhx HTN, HLD, DM, alzheimer, dementia [facility]. Spoke with wife (divine) and daughter. Patient is normally A&Ox0, minimally responsive and needs help with all his ADLS. Today, patient was not responsive at all, and has been eating less and less of his food for the past few days. Family was told that he has had 50-60% of his food for the past few days and that he has been continuously losing weight. Otherwise, there are no reported fevers, chills, nausea, vomiting episodes. ROS is otherwise unattainable from patient, does not respond to verbal commands, but does respond to painful stimuli (positive nail-bed test).

## 2022-10-03 NOTE — ED PROVIDER NOTE - OBJECTIVE STATEMENT
88 y/o M pmhx HTN, HLD, DM, alzheimer, dementia sent from [facility]. patient is A&Ox0 at baseline and non verbal. per nurses at the rehab patient was with PT and was not arousable like normal so was sent to ED. patient was recently admitted to [facility] for pneumonia and failure to thrive. he has been eating 60% of his meals and there are talks about a possible feeding tube per family. patient is responsive to painful stimuli. yellow thick sputum in mouth.

## 2022-10-03 NOTE — ED ADULT TRIAGE NOTE - CHIEF COMPLAINT QUOTE
Patient Brought from [facility] Patient with period of Unresponsiveness in Physical Therapy. Oxygen reported as 80-83 % NC increased to 6lpm. Oxygen saturation 98%. Patient with history of dementia: non verbal. Patient pushes away when Temperature attempted. Opens eyes to stimuli.

## 2022-10-03 NOTE — ED ADULT NURSE NOTE - OBJECTIVE STATEMENT
Patient Brought from [facility] Patient with period of Unresponsiveness in Physical Therapy. Oxygen reported as 80-83 % NC increased to 6lpm. Oxygen saturation 98%. Patient with history of dementia: non verbal. Patient pushes away when Temperature attempted. Opens eyes to stimuli.

## 2022-10-03 NOTE — H&P ADULT - PROBLEM SELECTOR PLAN 1
UA + with leuk esterases and wbcs  -c/w ceftriaxone  -f/u UCx  -f/u BCx  -does not meet sepsis criteria in the ED

## 2022-10-03 NOTE — H&P ADULT - ASSESSMENT
Progress Notes by Ana Mckeon NP at 6/18/2018 11:20 AM     Author: Ana Mckeon NP Service: -- Author Type: Nurse Practitioner    Filed: 6/18/2018 12:33 PM Encounter Date: 6/18/2018 Status: Signed    : Ana Mckeon NP (Nurse Practitioner)       Follow up Vascular Visit       Date of Service:6/18/2018    Date Last Seen: Visit date not found; Visit date not found    Chief Complaint: BLE lymphedema; left leg ulcer    History:   Past Medical History:   Diagnosis Date   ? A-fib (H) 12/2015   ? KAITLIN (acute kidney injury) (H)    ? Allergic rhinitis    ? Cellulitis of left lower extremity    ? Depressive disorder    ? Diabetes mellitus (H)    ? Dysmetabolic syndrome X    ? HLD (hyperlipidemia)    ? Hypertension    ? Hyponatremia    ? Morbid obesity (H)    ? TRUE on CPAP    ? Sepsis (H)    ? Streptococcal bacteremia    ? Tobacco use disorder    ? Varicose vein of leg        Pt returns to the Vascular clinic with regards to their BLE lymphedema and left leg ulcer. Pt arrives alone. His mother is currently doing all the wound cares; she tells the patient that there is significant odor when she changes the dressing. Current POC consists of strip of silvercel; and covering with bordered foam. Noted today that there was not enough dressing material tucked into the wound cavity. He was initially referred to Bariatrics; but then later canceled this appt; when I spoke to him about this he became very defensive and angry and did not want to talk about it further. He is trying to lose weight on his own; reports losing 20 pounds; when I asked how he is doing this he did not know how. He cannot understand how his swelling condition, wound formation; infections relate to his weight; I attempted several times and he did not want to discuss it further. He checks his fs daily; does not recall what types of numbers he is getting; his last A1C was down from 6 to 5.9% done earlier this month. Denies fevers, chills, rigors.  Is out of wound care supplies. A culture was obtained 1 month ago this grew Staph no antibiotics were written for. He is attending lymphedema therapy; they have gotten him circaid wraps he is wearing these all day; removing at bedtime; his mother must help him with this. FELIX was done this was normal; results were previous d/w with him.     Allergies: Ampicillin; Metformin; Mold; and Ciprofloxacin    Physical Exam:    /72  Pulse 64  Temp 97.6  F (36.4  C) (Oral)   Resp 18    General:  Patient presents to clinic in no apparent distress.  Head: normocephalic atraumatic  Psychiatric:  Alert and oriented x3.   Respiratory: unlabored breathing; no cough  Integumentary:  Skin is uniformly warm, dry and pink.    Wound #1 Location: left medial leg  Size: 0.8L x 1.5W x 3.5cmdepth.  No sinus tract present, Wound base: difficult to assess due to small nature of the opening; the depth is worse; tracks upwards at 12 o'clock; there is surrounding induration and additional area of firmness just proximal to the wound near the popliteal space  No undermining present. Periwound: no denudement, erythema, induration, maceration or warmth.      Circumferential volume measures:    Vasc Edema 4/30/2018 5/30/2018   Right just above MTP 32.0 31.7   Right Ankle 34.5 30   Right Widest Calf 51.0 48.5   Right Thigh Up 10cm 75.0 61.5   Left - just above MTP 30.4 29.5   Left Ankle 33.0 30   Left Widest Calf 71.5 62   Left Thigh Up 10cm 80.5 73.5       Ulceration(s)/Wound(s):     Wound 05/30/18 left medial leg (Active)   Pre Size Length 0.8 6/18/2018 10:00 AM   Pre Size Width 1.5 6/18/2018 10:00 AM   Pre Size Depth 3 6/18/2018 10:00 AM   Pre Total Sq cm 0.95 5/30/2018  2:00 PM        Lab Values    No results found for: SEDRATE  No results found for: CREATININE  No results found for: HGBA1C  No results found for: BUN  No results found for: LABPROT, LABALUM, ALBUMIN  No results found for: [id number]    6/18/18 left medial posterior calf  "region            Impression:  1. Venous insufficiency of both lower extremities --stable US Leg Non Vascular Left   2. Acquired lymphedema of leg --stable US Leg Non Vascular Left   3. Scar condition and fibrosis of skin -stable US Leg Non Vascular Left   4. 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 Diabetic peripheral neuropathy associated with type 2 diabetes mellitus (H)  US Leg Non Vascular Left   6. Morbid obesity with BMI of 50.0-59.9, adult (H)  US Leg Non Vascular Left   7. Swelling of left extremity  US Leg Non Vascular Left   8. Leg swelling  US Leg Non Vascular Left         Are any of these wounds new today: No; Location: na    Assessment/Plan:          1. Debridement: Excisional debridement of the ulcer(s) was recommended today, after consent was obtained and 2% Xylocaine was applied using a sterile curet the epidermal, dermal, down into the subcutaneous tissue and down into muscle tissue or ligamentous structures was sharply debrided for a total square cm of 0.95. The non-viable and necrotic tissue was removed and the wounds appeared much  afterwards.             2. Edema: Nearly complete with lymphedema therapy; has velcro wraps; continue these daily; ok for off at bedtime; he sleeps in a bed. The compression velcro wraps were applied today in clinic.           3.  Wound treatment:wound is worse today; I am concerned for bacterial overload and possible abscess; I ordered u/s to rule this out; no abscess was found; results were d/w patient; I recommend irrigating with dilute hibilcens (30cc in 500cc NS); pat dry; can use a 1cc syringe; then apply gent/bact ointment using a tongue depressor to apply in a \"spackle\" like fashion; cover with bordered foam dressing; he is out of cover dressings with provide with a few  extra           4. Nutrition: we spoke about his weight and how this can contribute to his swelling and ulcerations as well as infection risk; he " becomes very angry when discussing this; would prefer to just speak to his endocrinologist about this. He has declined bariatric referal             5. Offloading: not in a load bearing area; na     Patient will follow up with Dr. Arteaga in 4 weeks for reevaluation as scheduled. They were instructed to call the clinic sooner with any signs or symptoms of infection or any further questions/concerns. Answered all questions.    Ana Mckeon DNP, RN, CNP, [facility]  [phone]        This note was electronically signed by Ana Mckeon          86 y/o M pmhx HTN, HLD, DM, alzheimer's dementia, recent COVID sent from [facility] for worsening responsiveness, found to have UTI and severe dehydration.

## 2022-10-03 NOTE — ED PROVIDER NOTE - PHYSICAL EXAMINATION
Gen: Awake, NAD, responsive to painful stimuli   Head:  NC/AT  Eyes:  PERRL, EOMI, Conjunctiva pink, lids normal, no scleral icterus  ENT: OP clear, no exudates, dry mucus membranes  Cardiac/CV:  S1 S2, RRR, no M/G/R  Respiratory/Pulm:  CTAB, good air movement, normal resp effort, no wheezes/stridor/retractions/rales/rhonchi  Gastrointestinal/Abdomen:  Soft, non tender , nondistended, +BS, no rebound/guarding  Ext:  cool moving all extremities spontaneously, no peripheral edema, distal pulses intact  Skin: intact, no rash  Neuro:  AAOx0 responds to pain, moving around in bed

## 2022-10-03 NOTE — H&P ADULT - PROBLEM SELECTOR PLAN 3
weight loss, decreased po intake and now with hypokalemia and hypernatremia  -PT consult  -speech and swallow - if fails, will need NG tube for nutrition  -keep on IVF for now  -family interested in feeding tube, discussed that we will try to fix his infection and other issues prior to placing any feeding tube

## 2022-10-03 NOTE — H&P ADULT - PROBLEM SELECTOR PLAN 7
diet: npo except meds  dvt ppx: heparin subq  dispo: pending improvement in cystitis and renal function  ethics: full code, spoke with pts dtr and wife

## 2022-10-03 NOTE — ED ADULT NURSE NOTE - CHIEF COMPLAINT QUOTE
Patient Brought from [facility] Patient with period of Unresponsiveness in Physical Therapy. Oxygen reported as 80-83 % NC increased to 6lpm. Oxygen saturation 98%. Patient with history of dementia: non verbal. Patient pushes away when Temperature attempted. Opens eyes to stimuli.

## 2022-10-03 NOTE — H&P ADULT - NEUROLOGICAL
does not respond to verbal commands, only responds to pain./normal/cranial nerves II-XII intact/sensation intact/responds to pain

## 2022-10-03 NOTE — H&P ADULT - PROBLEM SELECTOR PLAN 2
Cr elevated to 2.68 from 1.15  Likely from decreased po intake and severe dehydration  -obtain renal US to r/o hydronephrosis  -strict Is/Os  -avoid nephrotoxic agents  -renally dose all medications  -repeat CR in AM

## 2022-10-03 NOTE — ED ADULT NURSE NOTE - NSIMPLEMENTINTERV_GEN_ALL_ED
Implemented All Fall with Harm Risk Interventions:  [place] to call system. Call bell, personal items and telephone within reach. Instruct patient to call for assistance. Room bathroom lighting operational. Non-slip footwear when patient is off stretcher. Physically safe environment: no spills, clutter or unnecessary equipment. Stretcher in lowest position, wheels locked, appropriate side rails in place. Provide visual cue, wrist band, yellow gown, etc. Monitor gait and stability. Monitor for mental status changes and reorient to person, place, and time. Review medications for side effects contributing to fall risk. Reinforce activity limits and safety measures with patient and family. Provide visual clues: red socks.

## 2022-10-03 NOTE — ED PROVIDER NOTE - CLINICAL SUMMARY MEDICAL DECISION MAKING FREE TEXT BOX
88 y/o M pmhx HTN, HLD, DM, alzheimer, dementia sent from [facility] for A[facility]. patient is A&Ox0 at baseline and non verbal. per nurses at the rehab patient was with PT and was not arousable like normal so was sent to ED. patient was recently admitted to [facility] for pneumonia and failure to thrive. he has been eating 60% of his meals and there are talks about a possible feeding tube per family. patient is responsive to painful stimuli. yellow thick sputum in mouth.-- moving all extremities in bed, lungs CTA, afebrile, -- will check basic labs, ekg, chest xray urine r/o infection. if wnl will return to [facility]

## 2022-10-03 NOTE — ED PROVIDER NOTE - ATTENDING APP SHARED VISIT CONTRIBUTION OF CARE
dehydration, uti, needs fluids, abx  I read ekg as sinus tach rate 112, no st elevation or depression, qtc 461, lafb, narrow qrs.

## 2022-10-04 LAB
ANION GAP SERPL CALC-SCNC: 9 MMOL/L — SIGNIFICANT CHANGE UP (ref 5–17)
BUN SERPL-MCNC: 74 MG/DL — HIGH (ref 7–23)
CALCIUM SERPL-MCNC: 9.2 MG/DL — SIGNIFICANT CHANGE UP (ref 8.5–10.1)
CHLORIDE SERPL-SCNC: 129 MMOL/L — HIGH (ref 96–108)
CO2 SERPL-SCNC: 16 MMOL/L — LOW (ref 22–31)
CREAT SERPL-MCNC: 2.23 MG/DL — HIGH (ref 0.5–1.3)
CULTURE RESULTS: SIGNIFICANT CHANGE UP
EGFR: 28 ML/MIN/1.73M2 — LOW
GLUCOSE SERPL-MCNC: 141 MG/DL — HIGH (ref 70–99)
HCT VFR BLD CALC: 47.3 % — SIGNIFICANT CHANGE UP (ref 39–50)
HGB BLD-MCNC: 14 G/DL — SIGNIFICANT CHANGE UP (ref 13–17)
MAGNESIUM SERPL-MCNC: 3.2 MG/DL — HIGH (ref 1.6–2.6)
MCHC RBC-ENTMCNC: 25.4 PG — LOW (ref 27–34)
MCHC RBC-ENTMCNC: 29.6 G/DL — LOW (ref 32–36)
MCV RBC AUTO: 85.7 FL — SIGNIFICANT CHANGE UP (ref 80–100)
NRBC # BLD: 0 /100 WBCS — SIGNIFICANT CHANGE UP (ref 0–0)
PHOSPHATE SERPL-MCNC: 4.3 MG/DL — SIGNIFICANT CHANGE UP (ref 2.5–4.5)
PLATELET # BLD AUTO: 206 K/UL — SIGNIFICANT CHANGE UP (ref 150–400)
POTASSIUM SERPL-MCNC: 5 MMOL/L — SIGNIFICANT CHANGE UP (ref 3.5–5.3)
POTASSIUM SERPL-SCNC: 5 MMOL/L — SIGNIFICANT CHANGE UP (ref 3.5–5.3)
RBC # BLD: 5.52 M/UL — SIGNIFICANT CHANGE UP (ref 4.2–5.8)
RBC # FLD: 16.5 % — HIGH (ref 10.3–14.5)
SODIUM SERPL-SCNC: 154 MMOL/L — HIGH (ref 135–145)
SPECIMEN SOURCE: SIGNIFICANT CHANGE UP
WBC # BLD: 10.05 K/UL — SIGNIFICANT CHANGE UP (ref 3.8–10.5)
WBC # FLD AUTO: 10.05 K/UL — SIGNIFICANT CHANGE UP (ref 3.8–10.5)

## 2022-10-04 PROCEDURE — 99233 SBSQ HOSP IP/OBS HIGH 50: CPT

## 2022-10-04 RX ORDER — SODIUM CHLORIDE 9 MG/ML
1000 INJECTION, SOLUTION INTRAVENOUS
Refills: 0 | Status: DISCONTINUED | OUTPATIENT
Start: 2022-10-04 | End: 2022-10-10

## 2022-10-04 RX ADMIN — SODIUM CHLORIDE 75 MILLILITER(S): 9 INJECTION, SOLUTION INTRAVENOUS at 16:52

## 2022-10-04 RX ADMIN — CEFTRIAXONE 100 MILLIGRAM(S): 500 INJECTION, POWDER, FOR SOLUTION INTRAMUSCULAR; INTRAVENOUS at 17:04

## 2022-10-04 RX ADMIN — Medication 1 TABLET(S): at 13:17

## 2022-10-04 RX ADMIN — TAMSULOSIN HYDROCHLORIDE 0.4 MILLIGRAM(S): 0.4 CAPSULE ORAL at 21:29

## 2022-10-04 RX ADMIN — HEPARIN SODIUM 5000 UNIT(S): 5000 INJECTION INTRAVENOUS; SUBCUTANEOUS at 06:39

## 2022-10-04 RX ADMIN — HEPARIN SODIUM 5000 UNIT(S): 5000 INJECTION INTRAVENOUS; SUBCUTANEOUS at 17:51

## 2022-10-04 RX ADMIN — Medication 1 TABLET(S): at 13:26

## 2022-10-04 RX ADMIN — SIMVASTATIN 20 MILLIGRAM(S): 20 TABLET, FILM COATED ORAL at 21:29

## 2022-10-04 RX ADMIN — AMLODIPINE BESYLATE 5 MILLIGRAM(S): 2.5 TABLET ORAL at 06:39

## 2022-10-04 RX ADMIN — MEMANTINE HYDROCHLORIDE 10 MILLIGRAM(S): 10 TABLET ORAL at 06:39

## 2022-10-04 RX ADMIN — MEMANTINE HYDROCHLORIDE 10 MILLIGRAM(S): 10 TABLET ORAL at 17:51

## 2022-10-04 RX ADMIN — DONEPEZIL HYDROCHLORIDE 10 MILLIGRAM(S): 10 TABLET, FILM COATED ORAL at 21:29

## 2022-10-04 NOTE — PATIENT PROFILE ADULT - FALL HARM RISK - HARM RISK INTERVENTIONS

## 2022-10-04 NOTE — SWALLOW BEDSIDE ASSESSMENT ADULT - SLP GENERAL OBSERVATIONS
Pt seen bedside, alert and nonverbal. Unable to complete assessment questions and oral [facility] exam due to pt comprehension. Pt seen bedside, alert and nonverbal. Essentially noncommunicative. Began to communicate some utterances after moderately thick liquid PO intake. Pt was restless, began to move blankets away from him. Pt seen bedside, alert and ?oriented to self. pt nonverbal, essentially noncommunicative except some spontaneous utterances "yes" to more juice otherwise mostly unintelligible. Pt did not follow directions for oral mech exam and noted restlessness.

## 2022-10-04 NOTE — PROGRESS NOTE ADULT - SUBJECTIVE AND OBJECTIVE BOX
Patient is a 87y old  Male who presents with a chief complaint of decrease po intake (03 Oct 2022 21:28)    HPI:  86 y/o M pmhx HTN, HLD, DM, alzheimer, dementia sent from [facility] for worsening responsiveness. Spoke with wife (divine) and daughter. Patient is normally A&Ox0, minimally responsive and needs help with all his ADLS. Today, patient was not responsive at all, and has been eating less and less of his food for the past few days. Family was told that he has had 50-60% of his food for the past few days and that he has been continuously losing weight. Otherwise, there are no reported fevers, chills, nausea, vomiting episodes. ROS is otherwise unattainable from patient, does not respond to verbal commands, but does respond to painful stimuli (positive nail-bed test).  (03 Oct 2022 21:28)    SUBJECTIVE & OBJECTIVE: Pt seen and examined at bedside. More awake today   PHYSICAL EXAM:  T(C): 36.2 (10-04-22 @ 12:10), Max: 36.7 (10-03-22 @ 19:21)  HR: 92 (10-04-22 @ 12:10) (75 - 96)  BP: 122/79 (10-04-22 @ 12:10) (110/72 - 122/79)  RR: 18 (10-04-22 @ 12:10) (17 - 18)  SpO2: 97% (10-04-22 @ 12:10) (95% - 97%) Daily     Daily I&O's Detail    GENERAL: chronically ill appearing  HEAD:  Atraumatic, Normocephalic  EYES: EOMI, PERRLA, conjunctiva and sclera clear  ENMT: Moist mucous membranes  NECK: Supple, No JVD  NERVOUS SYSTEM:  encephalopathic, bedridden  CHEST/LUNG: Clear to auscultation bilaterally; No rales, rhonchi, wheezing, or rubs  HEART: Regular rate and rhythm; No murmurs, rubs, or gallops  ABDOMEN: Soft, Nontender, Nondistended; Bowel sounds present  EXTREMITIES:  2+ Peripheral Pulses, No clubbing, cyanosis, or edema  LABS:                        15.5   11.61 )-----------( 237      ( 03 Oct 2022 13:04 )             52.3   Urinalysis Basic - ( 03 Oct 2022 13:10 )    Color: Yellow / Appearance: very cloudy / S.025 / pH: x  Gluc: x / Ketone: Trace  / Bili: Negative / Urobili: Negative mg/dL   Blood: x / Protein: 100 mg/dL / Nitrite: Negative   Leuk Esterase: Moderate / RBC: >50 /HPF / WBC >50   Sq Epi: x / Non Sq Epi: Few / Bacteria: Many    CAPILLARY BLOOD GLUCOSE        RECENT CULTURES: blood and urine cultures are pending   RADIOLOGY & ADDITIONAL TESTS: Renal ultrasound is pending.

## 2022-10-04 NOTE — PHYSICAL THERAPY INITIAL EVALUATION ADULT - ACTIVE RANGE OF MOTION EXAMINATION, REHAB EVAL
minimal spontaneous AROM noted to both UE, however not on command. No AROM noted to both LE./deficits as listed below

## 2022-10-04 NOTE — PATIENT PROFILE ADULT - FUNCTIONAL ASSESSMENT - DAILY ACTIVITY 5.
1 = Total assistance
PAST MEDICAL HISTORY:  Amyotrophic lateral sclerosis (ALS)     Cerebral infarct     Dysphagia     Gastroesophageal reflux disease, unspecified whether esophagitis present     Motor neuron disease ALS    Myasthenia gravis

## 2022-10-04 NOTE — PROGRESS NOTE ADULT - ASSESSMENT
86 y/o M pmhx HTN, HLD, DM, alzheimer's dementia, recent COVID sent from [facility] for worsening responsiveness, found to have UTI and severe dehydration.     Problem/Plan - 1:  ·  Problem: Acute cystitis.   ·  Plan: UA + with leuk esterases and wbcs  -c/w ceftriaxone for 3 days   -f/u UCx  -f/u BCx  - hold BP  medications for now    Problem/Plan - 2:  ·  Problem: RADHA (acute kidney injury).   ·  Plan: Cr elevated to 2.68 from 1.15  Likely from decreased po intake and severe dehydration  - Renal US is pending   -strict Is/Os  -avoid nephrotoxic agents  -renally dose all medications  -Cr is marginally improved today,   - c/w IVF    Problem/Plan - 3:  ·  Problem: Functional Quadriplejia  ·  Plan: weight loss, decreased po intake and now with hypokalemia and hypernatremia   - speech and swallow done  - patient may have supervised feeds Puree with thickened liquids   - no PT eval, patient is LTC??    Problem/Plan - 4:  ·  Problem: Hypernatremia  ·  Plan: from decreased po intake and volume contraction   - change fluids to D5 at 75 cc/hr     Problem/Plan - 5:  ·  Problem: Hypertension.   ·  Plan: hold amlodipine for now    Problem/Plan - 6:  ·  Problem: Alzheimer's dementia.   ·  Plan: c/w memantine and donepazil.    Problem/Plan - 7:  ·  Problem: Need for prophylactic measure.   ·  Plan: diet: pureed with thickened liquids   dvt ppx: heparin subq  dispo: pending improvement in cystitis and renal function  ethics: full code, spoke with pts dtr and wife.

## 2022-10-04 NOTE — SWALLOW BEDSIDE ASSESSMENT ADULT - COMMENTS
xray chest 1 view 10/03/2022 IMPRESSION: Clear lungs with no acute cardiopulmonary abnormalities.    ct head no contrast 09/06/2022 IMPRESSION:  Age-appropriate involutional change and microvascular ischemic disease. No evidence of intracranial hemorrhage xray chest 1 view 10/03/2022 IMPRESSION: Clear lungs with no acute cardiopulmonary abnormalities.    CT head no contrast 09/06/2022 IMPRESSION:  Age-appropriate involutional change and microvascular ischemic disease. No evidence of intracranial hemorrhage

## 2022-10-04 NOTE — PHYSICAL THERAPY INITIAL EVALUATION ADULT - PERTINENT HX OF CURRENT PROBLEM, REHAB EVAL
Pt is an 87-y/o, male, sent to [facility] from [facility] due to AMS; pt admitted for dehydration, acute UTI. Pt also tested (+) for COVID-19. Pt now referred to PT for evaluation.

## 2022-10-04 NOTE — PHYSICAL THERAPY INITIAL EVALUATION ADULT - BED MOBILITY TRAINING, PT EVAL
Pt will perform all aspects of bed mobility with Min assist to help prevent pressure ulcers, by 6 weeks.

## 2022-10-04 NOTE — SWALLOW BEDSIDE ASSESSMENT ADULT - H & P REVIEW
"86 y/o M pmhx HTN, HLD, DM, alzheimer, dementia [facility]. Spoke with wife (divine) and daughter. Patient is normally A&Ox0, minimally responsive and needs help with all his ADLS. Today, patient was not responsive at all, and has been eating less and less of his food for the past few days. Family was told that he has had 50-60% of his food for the past few days and that he has been continuously losing weight. Otherwise, there are no reported fevers, chills, nausea, vomiting episodes. ROS is otherwise unattainable from patient, does not respond to verbal commands, but does respond to painful stimuli (positive nail-bed test)."/yes

## 2022-10-04 NOTE — PATIENT PROFILE ADULT - FUNCTIONAL ASSESSMENT - BASIC MOBILITY 6.
1-calculated by average/Not able to assess (calculate score using [facility] averaging method)

## 2022-10-04 NOTE — SWALLOW BEDSIDE ASSESSMENT ADULT - DIET PRIOR TO ADMI
Pureed with thin liquids Pureed with thin liquids as per NH notes as per NH notes Pureed with thin liquids

## 2022-10-04 NOTE — SWALLOW BEDSIDE ASSESSMENT ADULT - ORAL PREPARATORY PHASE
Refuses to accept bolus into oral cavity/Anterior loss of bolus/Decreased mastication ability Anterior loss of bolus/Decreased mastication ability Anterior loss of bolus Refuses to accept bolus into oral cavity/Anterior loss of bolus

## 2022-10-04 NOTE — SWALLOW BEDSIDE ASSESSMENT ADULT - SWALLOW EVAL: RECOMMENDED FEEDING/EATING TECHNIQUES
allow for swallow between intakes/crush medication (when feasible)/maintain upright posture during/after eating for 30 mins/small sips/bites/tuck chin allow for swallow between intakes/crush medication (when feasible)/maintain upright posture during/after eating for 30 mins/oral hygiene/small sips/bites

## 2022-10-04 NOTE — PHYSICAL THERAPY INITIAL EVALUATION ADULT - ADDITIONAL COMMENTS
Pt is too lethargic to provide PLOF. Prior PT notes indicate that pt lives with wife in an apartment with no steps, +elevator access; pt was ambulatory with walker. Pt was admitted from [facility].

## 2022-10-04 NOTE — SWALLOW BEDSIDE ASSESSMENT ADULT - SWALLOW EVAL: DIAGNOSIS
Oropharyngeal phases of swallow marked by refusal of bolus into oral cavity, reduced labial seal, anterior loss of bolus, reduced mastication ability, delayed oral transit time, delayed pharyngeal swallow, and multiple swallows. Oropharyngeal phases of swallow marked by refusal of bolus into oral cavity, reduced labial seal, anterior loss of bolus, delayed oral transit time, delayed pharyngeal swallow, and multiple swallows. No clinical signs of laryngeal penetration or aspiration. Oropharyngeal phases of swallow marked by fair oral opening/ po acceptance, reduced labial seal, intermittent anterior loss of bolus, delayed oral transit time- with munching, +hyolaryngeal elevation to palpation, ?delayed pharyngeal swallow, and multiple swallows. No clinical signs of laryngeal penetration or aspiration with consistencies trialed.

## 2022-10-04 NOTE — PHYSICAL THERAPY INITIAL EVALUATION ADULT - CRITERIA FOR SKILLED THERAPEUTIC INTERVENTIONS
impairments found/functional limitations in following categories/risk reduction/prevention/rehab potential/therapy frequency/predicted duration of therapy intervention/anticipated equipment needs at discharge/anticipated discharge recommendation
Negative

## 2022-10-04 NOTE — SWALLOW BEDSIDE ASSESSMENT ADULT - SWALLOW EVAL: ORAL MUSCULATURE
unable to assess due to poor participation/comprehension informal observation/generally intact/unable to assess due to poor participation/comprehension

## 2022-10-05 LAB
ANION GAP SERPL CALC-SCNC: 7 MMOL/L — SIGNIFICANT CHANGE UP (ref 5–17)
BUN SERPL-MCNC: 49 MG/DL — HIGH (ref 7–23)
CALCIUM SERPL-MCNC: 8.7 MG/DL — SIGNIFICANT CHANGE UP (ref 8.5–10.1)
CHLORIDE SERPL-SCNC: 125 MMOL/L — HIGH (ref 96–108)
CO2 SERPL-SCNC: 23 MMOL/L — SIGNIFICANT CHANGE UP (ref 22–31)
CREAT SERPL-MCNC: 1.73 MG/DL — HIGH (ref 0.5–1.3)
EGFR: 38 ML/MIN/1.73M2 — LOW
GLUCOSE SERPL-MCNC: 125 MG/DL — HIGH (ref 70–99)
HCT VFR BLD CALC: 36.9 % — LOW (ref 39–50)
HGB BLD-MCNC: 11 G/DL — LOW (ref 13–17)
MCHC RBC-ENTMCNC: 25.9 PG — LOW (ref 27–34)
MCHC RBC-ENTMCNC: 29.8 G/DL — LOW (ref 32–36)
MCV RBC AUTO: 87 FL — SIGNIFICANT CHANGE UP (ref 80–100)
NRBC # BLD: 0 /100 WBCS — SIGNIFICANT CHANGE UP (ref 0–0)
PLATELET # BLD AUTO: 153 K/UL — SIGNIFICANT CHANGE UP (ref 150–400)
POTASSIUM SERPL-MCNC: 4.2 MMOL/L — SIGNIFICANT CHANGE UP (ref 3.5–5.3)
POTASSIUM SERPL-SCNC: 4.2 MMOL/L — SIGNIFICANT CHANGE UP (ref 3.5–5.3)
PROCALCITONIN SERPL-MCNC: 0.05 NG/ML — SIGNIFICANT CHANGE UP (ref 0.02–0.1)
RBC # BLD: 4.24 M/UL — SIGNIFICANT CHANGE UP (ref 4.2–5.8)
RBC # FLD: 16.2 % — HIGH (ref 10.3–14.5)
SODIUM SERPL-SCNC: 155 MMOL/L — HIGH (ref 135–145)
WBC # BLD: 7.69 K/UL — SIGNIFICANT CHANGE UP (ref 3.8–10.5)
WBC # FLD AUTO: 7.69 K/UL — SIGNIFICANT CHANGE UP (ref 3.8–10.5)

## 2022-10-05 PROCEDURE — 99233 SBSQ HOSP IP/OBS HIGH 50: CPT

## 2022-10-05 RX ADMIN — MEMANTINE HYDROCHLORIDE 10 MILLIGRAM(S): 10 TABLET ORAL at 17:56

## 2022-10-05 RX ADMIN — MEMANTINE HYDROCHLORIDE 10 MILLIGRAM(S): 10 TABLET ORAL at 06:10

## 2022-10-05 RX ADMIN — HEPARIN SODIUM 5000 UNIT(S): 5000 INJECTION INTRAVENOUS; SUBCUTANEOUS at 06:09

## 2022-10-05 RX ADMIN — Medication 1 TABLET(S): at 12:16

## 2022-10-05 RX ADMIN — SODIUM CHLORIDE 75 MILLILITER(S): 9 INJECTION, SOLUTION INTRAVENOUS at 06:09

## 2022-10-05 RX ADMIN — CEFTRIAXONE 100 MILLIGRAM(S): 500 INJECTION, POWDER, FOR SOLUTION INTRAMUSCULAR; INTRAVENOUS at 15:09

## 2022-10-05 RX ADMIN — HEPARIN SODIUM 5000 UNIT(S): 5000 INJECTION INTRAVENOUS; SUBCUTANEOUS at 17:57

## 2022-10-05 RX ADMIN — SODIUM CHLORIDE 75 MILLILITER(S): 9 INJECTION, SOLUTION INTRAVENOUS at 17:57

## 2022-10-05 RX ADMIN — Medication 1 TABLET(S): at 12:15

## 2022-10-05 NOTE — OCCUPATIONAL THERAPY INITIAL EVALUATION ADULT - IMPAIRMENTS CONTRIBUTING IMPAIRED BED MOBILITY, REHAB EVAL
impaired balance/cognition/impaired motor control/impaired postural control/decreased strength cognition

## 2022-10-05 NOTE — OCCUPATIONAL THERAPY INITIAL EVALUATION ADULT - BALANCE TRAINING, PT EVAL
Pt. will increase static/dynamic sitting balance by 1/2 grade in order to assist with UB dressing sitting at EOB. Patient will be able to increase static and dynamic sitting/standing by 1/2 grade in order to participate in self care tasks and functional mobility/transfers within 4 weeks

## 2022-10-05 NOTE — OCCUPATIONAL THERAPY INITIAL EVALUATION ADULT - PLANNED THERAPY INTERVENTIONS, OT EVAL
ADL retraining/balance training/bed mobility training/strengthening balance training/bed mobility training/strengthening

## 2022-10-05 NOTE — PROGRESS NOTE ADULT - SUBJECTIVE AND OBJECTIVE BOX
Patient is a 87y old  Male who presents with a chief complaint of decrease po intake (04 Oct 2022 13:33)    HPI:  88 y/o M pmhx HTN, HLD, DM, alzheimer, dementia sent from [facility] for worsening responsiveness. Spoke with wife (divine) and daughter. Patient is normally A&Ox0, minimally responsive and needs help with all his ADLS. Today, patient was not responsive at all, and has been eating less and less of his food for the past few days. Family was told that he has had 50-60% of his food for the past few days and that he has been continuously losing weight. Otherwise, there are no reported fevers, chills, nausea, vomiting episodes. ROS is otherwise unattainable from patient, does not respond to verbal commands, but does respond to painful stimuli (positive nail-bed test).  (03 Oct 2022 21:28)    SUBJECTIVE & OBJECTIVE: Pt seen and examined at bedside. Decreased oral intake today   PHYSICAL EXAM:  T(C): 36.9 (10-05-22 @ 11:57), Max: 36.9 (10-05-22 @ 11:57)  HR: 81 (10-05-22 @ 11:57) (74 - 90)  BP: 106/69 (10-05-22 @ 11:57) (95/65 - 131/77)  RR: 18 (10-05-22 @ 11:57) (16 - 18)  SpO2: 97% (10-05-22 @ 11:57) (96% - 97%) Daily     Daily I&O's Detail    GENERAL: very thin and chronically ill appearing   HEAD:  Atraumatic, Normocephalic  EYES: EOMI, PERRLA, conjunctiva and sclera clear  ENMT: Moist mucous membranes, edentulous  NECK: Supple, No JVD  NERVOUS SYSTEM:  encephalopathic and not following commands, Motor Strength 4/5 B/L upper and lower extremities; DTRs 2+ intact and symmetric  CHEST/LUNG: poor air entry bilaterally   HEART: Regular rate and rhythm; No murmurs, rubs, or gallops  ABDOMEN: Soft, Nontender, Nondistended; Bowel sounds present  EXTREMITIES:  2+ Peripheral Pulses, No clubbing, cyanosis, or edema  LABS:                        11.0   7.69  )-----------( 153      ( 05 Oct 2022 07:15 )             36.9   CAPILLARY BLOOD GLUCOSE        RECENT CULTURES: blood cultures are pending, urine culture is growing >3 organisms   RADIOLOGY & ADDITIONAL TESTS:

## 2022-10-05 NOTE — OCCUPATIONAL THERAPY INITIAL EVALUATION ADULT - RANGE OF MOTION EXAMINATION, UPPER EXTREMITY
B UE grossly WFL based on involuntary movement/tests against gravity B UE grossly WFL based on involuntary movement/tests against gravity/bilateral UE Passive ROM was WFL  (within functional limits)

## 2022-10-05 NOTE — OCCUPATIONAL THERAPY INITIAL EVALUATION ADULT - STRENGTHENING, PT EVAL
Pt. will increase B/L UE/LE strength by 1/2 grade in order to assist with grooming and feeding tasks. Pt will increase BUE/BLE strength to 5/5 to improve functional strength needed to engage in functional tasks by 4 weeks

## 2022-10-05 NOTE — OCCUPATIONAL THERAPY INITIAL EVALUATION ADULT - BED MOBILITY TRAINING, PT EVAL
Patient will be able to perform bed mobility tasks independently, using least restrictive device, within 4 weeks. Patient will be able to perform bed mobility tasks with assist, using least restrictive device, within 8 weeks.

## 2022-10-05 NOTE — OCCUPATIONAL THERAPY INITIAL EVALUATION ADULT - PERTINENT HX OF CURRENT PROBLEM, REHAB EVAL
Pt admitted from [facility] due to dehydration and acute UTI. Covid-19 positive 10/4/22    -Per Emergency Documentation (10/3/22), [facility] nurses state "patient is A&Ox0 at baseline and non verbal"     -Per Progress Note (9/16/22), wife (Leon Branch) states pt was "ambulatory with a walker"

## 2022-10-05 NOTE — OCCUPATIONAL THERAPY INITIAL EVALUATION ADULT - ADDITIONAL COMMENTS
Pt admitted from [facility] (10/3/22) but prior was living in an apartment which did not have any stairs to enter. Elevator access to apartment was available. H&P (10/3/22) states pt "needs help with all his ADLs" while per Progress Note (9/16/22), wife (Leon Branch) states pt was "ambulatory with a walker" Pt unreliable historian: As per EMR, Pt admitted from [facility] (10/3/22) but prior was living in an apartment which did not have any stairs to enter. Elevator access to apartment was available. H&P (10/3/22) states pt "needs help with all his ADLs" while per Progress Note (9/16/22), wife (Leon Branch) states pt was "ambulatory with a walker"  Per progress note (9/16/22) pt's wife cannot assist pt.

## 2022-10-05 NOTE — OCCUPATIONAL THERAPY INITIAL EVALUATION ADULT - RANGE OF MOTION EXAMINATION, LOWER EXTREMITY
B LE grossly WFL based on involuntary movement/tests against gravity B LE grossly WFL based on involuntary movement/tests against gravity/bilateral LE Passive ROM was WFL  (within functional limits)

## 2022-10-05 NOTE — PROGRESS NOTE ADULT - ASSESSMENT
86 y/o M pmhx HTN, HLD, DM, alzheimer's dementia, recent COVID [facility], found to have UTI and severe dehydration.     Problem/Plan - 1:  ·  Problem: Acute cystitis.   ·  Plan: UA + with leuk esterases and wbcs  -c/w ceftriaxone for 3 days   -urine culture is growing > 3 organism  - d/c Ceftriaxone tomorrow     Problem/Plan - 2:  ·  Problem: RADHA (acute kidney injury).   ·  Plan: Cr elevated to 2.68 from 1.15  Likely from decreased po intake and severe dehydration  - Renal US is pending   -strict Is/Os  -avoid nephrotoxic agents  -renally dose all medications  -continuing to follow Cr    Problem/Plan - 3:  ·  Problem: Functional Quadriplejia  ·  Plan: weight loss, decreased po intake and now with hypokalemia and hypernatremia   - speech and swallow done  - patient may have supervised feeds Puree with thickened liquids   - discussed patient's prognosis with family, they would like patient to be d/c to LTC when ready     Problem/Plan - 4:  ·  Problem: Hypernatremia  ·  Plan: from decreased po intake and volume contraction   - change fluids to D5 at 75 cc/hr   - following bmp today     Problem/Plan - 5:  ·  Problem: Hypertension.   ·  Plan: hold amlodipine for now    Problem/Plan - 6:  ·  Problem: Alzheimer's dementia.   ·  Plan: c/w memantine and donepazil.    Problem/Plan - 7:  ·  Problem: Need for prophylactic measure.   ·  Plan: diet: pureed with thickened liquids   dvt ppx: heparin subq  dispo: pending improvement in cystitis and renal function  ethics: full code, spoke with pts dtr and wife.   88 y/o M pmhx HTN, HLD, DM, alzheimer's dementia, recent COVID sent from [facility] for worsening responsiveness, found to have UTI and severe dehydration.     Problem/Plan - 1:  ·  Problem: Acute cystitis.   ·  Plan: UA + with leuk esterases and wbcs  -c/w ceftriaxone for 3 days   -urine culture is growing > 3 organism  - d/c Ceftriaxone tomorrow     Problem/Plan - 2:  ·  Problem: RADHA (acute kidney injury).   ·  Plan: Cr elevated to 2.68 from 1.15  Likely from decreased po intake and severe dehydration  - Renal US is pending   -strict Is/Os  -avoid nephrotoxic agents  -renally dose all medications  -continuing to follow Cr    Problem/Plan - 3:  ·  Problem: Functional Quadriplejia  ·  Plan: weight loss, decreased po intake and now with hypokalemia and hypernatremia   - speech and swallow done  - patient may have supervised feeds Puree with thickened liquids   - discussed patient's prognosis with family, they would like patient to be d/c to LTC when ready     Problem/Plan - 4:  ·  Problem: Hypernatremia  ·  Plan: from decreased po intake and volume contraction   - change fluids to D5 at 75 cc/hr   - following bmp today     Problem/Plan - 5:  ·  Problem: Hypertension.   ·  Plan: hold amlodipine for now    Problem/Plan - 6:  ·  Problem: Alzheimer's dementia.   ·  Plan: c/w memantine and donepazil.    Problem/Plan - 7:  ·  Problem: Anemia  - baseline hgb 12.0  on admission the patient was volume contracted and hgb was 15.  His current hgb of 11 likely represents his normal baseline. Doubt acute blood loss.  check hgb in am        Need for prophylactic measure.   ·  Plan: diet: pureed with thickened liquids   dvt ppx: heparin subq  dispo: pending improvement in cystitis and renal function  ethics: full code, spoke with pts dtr and wife.

## 2022-10-05 NOTE — OCCUPATIONAL THERAPY INITIAL EVALUATION ADULT - MANUAL MUSCLE TESTING RESULTS, REHAB EVAL
Pt presented as non-responsive to cueing and lethargy/somnolence/not tested due to Unable to assess due to cognition and arousal level.

## 2022-10-05 NOTE — OCCUPATIONAL THERAPY INITIAL EVALUATION ADULT - GENERAL OBSERVATIONS, REHAB EVAL
Karl (see CTC) would like Home Care to vee [facility] due to her dementia. Spouse sets up meds and reminds patient to take meds but patient refuses 2-3 times a week. Patient also refuses to take her baths and to change clothes on a daily basis. Patient is incontinent of urine for the past 2 months but is using depends. He is also concerned about her diet. Open to having  get involved.     Pt was encounterd sleeping semisupine in bed. NAD, Pt required tactile input and verbal cueing for arousal. Once arouses pt presented as A&OX0. Pt presented as lethargic and non-verbal. Pt was Max A with 2 person assist (Latosha Goetz DPT present) during bed mobility and sit at EOB. During sit at EOB pt presented with poor trunk control and asymetrical trunk posturing (lean to R side). Gross AROM noted in all extremities via passive ranging, ranging against gravity and involuntary movements. Pt was encounterd sleeping semisupine in bed with PT Latosha present; NAD, PIV +; Pt required tactile input and verbal cueing for arousal. Once aroused, pt was oriented to 0. Pt presented as lethargic and non-verbal.

## 2022-10-06 DIAGNOSIS — Z51.5 ENCOUNTER FOR PALLIATIVE CARE: ICD-10-CM

## 2022-10-06 DIAGNOSIS — E43 UNSPECIFIED SEVERE PROTEIN-CALORIE MALNUTRITION: ICD-10-CM

## 2022-10-06 DIAGNOSIS — R53.81 OTHER MALAISE: ICD-10-CM

## 2022-10-06 DIAGNOSIS — N39.0 URINARY TRACT INFECTION, SITE NOT SPECIFIED: ICD-10-CM

## 2022-10-06 LAB
ANION GAP SERPL CALC-SCNC: 7 MMOL/L — SIGNIFICANT CHANGE UP (ref 5–17)
BUN SERPL-MCNC: 42 MG/DL — HIGH (ref 7–23)
CALCIUM SERPL-MCNC: 8.6 MG/DL — SIGNIFICANT CHANGE UP (ref 8.5–10.1)
CHLORIDE SERPL-SCNC: 120 MMOL/L — HIGH (ref 96–108)
CO2 SERPL-SCNC: 23 MMOL/L — SIGNIFICANT CHANGE UP (ref 22–31)
CREAT SERPL-MCNC: 1.59 MG/DL — HIGH (ref 0.5–1.3)
EGFR: 42 ML/MIN/1.73M2 — LOW
GLUCOSE SERPL-MCNC: 110 MG/DL — HIGH (ref 70–99)
HCT VFR BLD CALC: 41.4 % — SIGNIFICANT CHANGE UP (ref 39–50)
HGB BLD-MCNC: 12.2 G/DL — LOW (ref 13–17)
MCHC RBC-ENTMCNC: 25.2 PG — LOW (ref 27–34)
MCHC RBC-ENTMCNC: 29.5 G/DL — LOW (ref 32–36)
MCV RBC AUTO: 85.5 FL — SIGNIFICANT CHANGE UP (ref 80–100)
NRBC # BLD: 0 /100 WBCS — SIGNIFICANT CHANGE UP (ref 0–0)
PLATELET # BLD AUTO: 153 K/UL — SIGNIFICANT CHANGE UP (ref 150–400)
POTASSIUM SERPL-MCNC: 4.3 MMOL/L — SIGNIFICANT CHANGE UP (ref 3.5–5.3)
POTASSIUM SERPL-SCNC: 4.3 MMOL/L — SIGNIFICANT CHANGE UP (ref 3.5–5.3)
RBC # BLD: 4.84 M/UL — SIGNIFICANT CHANGE UP (ref 4.2–5.8)
RBC # FLD: 16 % — HIGH (ref 10.3–14.5)
SODIUM SERPL-SCNC: 150 MMOL/L — HIGH (ref 135–145)
WBC # BLD: 6.6 K/UL — SIGNIFICANT CHANGE UP (ref 3.8–10.5)
WBC # FLD AUTO: 6.6 K/UL — SIGNIFICANT CHANGE UP (ref 3.8–10.5)

## 2022-10-06 PROCEDURE — 99497 ADVNCD CARE PLAN 30 MIN: CPT | Mod: 25

## 2022-10-06 PROCEDURE — 99223 1ST HOSP IP/OBS HIGH 75: CPT

## 2022-10-06 PROCEDURE — 99233 SBSQ HOSP IP/OBS HIGH 50: CPT

## 2022-10-06 RX ADMIN — HEPARIN SODIUM 5000 UNIT(S): 5000 INJECTION INTRAVENOUS; SUBCUTANEOUS at 05:38

## 2022-10-06 RX ADMIN — TAMSULOSIN HYDROCHLORIDE 0.4 MILLIGRAM(S): 0.4 CAPSULE ORAL at 23:04

## 2022-10-06 RX ADMIN — SIMVASTATIN 20 MILLIGRAM(S): 20 TABLET, FILM COATED ORAL at 23:03

## 2022-10-06 RX ADMIN — DONEPEZIL HYDROCHLORIDE 10 MILLIGRAM(S): 10 TABLET, FILM COATED ORAL at 23:04

## 2022-10-06 RX ADMIN — MEMANTINE HYDROCHLORIDE 10 MILLIGRAM(S): 10 TABLET ORAL at 05:39

## 2022-10-06 RX ADMIN — Medication 1 TABLET(S): at 12:38

## 2022-10-06 RX ADMIN — CEFTRIAXONE 100 MILLIGRAM(S): 500 INJECTION, POWDER, FOR SOLUTION INTRAMUSCULAR; INTRAVENOUS at 17:49

## 2022-10-06 RX ADMIN — SODIUM CHLORIDE 75 MILLILITER(S): 9 INJECTION, SOLUTION INTRAVENOUS at 12:38

## 2022-10-06 RX ADMIN — HEPARIN SODIUM 5000 UNIT(S): 5000 INJECTION INTRAVENOUS; SUBCUTANEOUS at 17:49

## 2022-10-06 RX ADMIN — MEMANTINE HYDROCHLORIDE 10 MILLIGRAM(S): 10 TABLET ORAL at 17:50

## 2022-10-06 RX ADMIN — PANTOPRAZOLE SODIUM 40 MILLIGRAM(S): 20 TABLET, DELAYED RELEASE ORAL at 05:39

## 2022-10-06 NOTE — DIETITIAN INITIAL EVALUATION ADULT - FEEDING SKILL
RN reports pt needs complete and total assistance; pt takes a long time to consume just a few bites of food; needs frequent coaching and redirection/total assistance

## 2022-10-06 NOTE — DIETITIAN INITIAL EVALUATION ADULT - PERTINENT MEDS FT
MEDICATIONS  (STANDING):  cefTRIAXone   IVPB 1000 milliGRAM(s) IV Intermittent every 24 hours  dextrose 5%. 1000 milliLiter(s) (75 mL/Hr) IV Continuous <Continuous>  donepezil 10 milliGRAM(s) Oral at bedtime  heparin   Injectable 5000 Unit(s) SubCutaneous every 12 hours  lactobacillus acidophilus 1 Tablet(s) Oral daily  memantine 10 milliGRAM(s) Oral two times a day  multivitamin 1 Tablet(s) Oral daily  pantoprazole    Tablet 40 milliGRAM(s) Oral before breakfast  simvastatin 20 milliGRAM(s) Oral at bedtime  tamsulosin 0.4 milliGRAM(s) Oral at bedtime    MEDICATIONS  (PRN):  melatonin 3 milliGRAM(s) Oral at bedtime PRN Insomnia

## 2022-10-06 NOTE — DIETITIAN INITIAL EVALUATION ADULT - PERTINENT LABORATORY DATA
10-06    150<H>  |  120<H>  |  42<H>  ----------------------------<  110<H>  4.3   |  23  |  1.59<H>    Ca    8.6      06 Oct 2022 06:10    A1C with Estimated Average Glucose Result: 6.7 % (09-07-22 @ 06:55)

## 2022-10-06 NOTE — DIETITIAN INITIAL EVALUATION ADULT - OTHER INFO
Pt confused and on COVID isolation. Information obtained from chart, previous RD note (09/13/22) and Registered Dietitian from [facility].   Pt presents from [facility] where he was receiving a puree diet, DASH/TLC, consistent carbohydrate + Glucerna x 3/day (provides 660 kcal, 30 g protein), thin liquids. Registered Dietitian at [facility] reports pt with little to no PO intake during time at [facility]- calorie count was done there. Per previous RD note (09/13/22) pt had poor PO intake during LOS here at [facility] prior to admission to [facility].     Per flow sheets and MD notes pt remains with poor PO intake during LOS. RN reports pt consumed almost nothing yesterday (10/05) and consumed about 20% of his oatmeal at breakfast and lunch today (10/06). s/p swallow evaluation 10/04 with recommendations for puree with moderately thickened liquids. Weight history per previous RD note (09/13/22) 67.8kg. Weight loss as noted below.    Discussed adding nutritional supplements with RN. RN made aware RD remains available.

## 2022-10-06 NOTE — DIETITIAN NUTRITION RISK NOTIFICATION - TREATMENT: THE FOLLOWING DIET HAS BEEN RECOMMENDED
Diet, Pureed:   Moderately Thick Liquids (MODTHICKLIQS)  Supplement Feeding Modality:  Oral  Ensure Pudding Cans or Servings Per Day:  1       Frequency:  Two Times a day (10-06-22 @ 13:38) [Pending Verification By Attending]  Diet, Pureed:   Moderately Thick Liquids (MODTHICKLIQS) (10-04-22 @ 13:34) [Active]

## 2022-10-06 NOTE — DIETITIAN INITIAL EVALUATION ADULT - ADD RECOMMEND
Palliative consult 1. Palliative consult  2. Consider alternate means of nutrition (enteral nutrition) if in line with pt/family wishes for care

## 2022-10-06 NOTE — DIETITIAN INITIAL EVALUATION ADULT - DIET TYPE
Add Ensure Pudding x 2/day (provides 340 kcal, 8 g protein) + Magic Cup x 2/day (provides 580 kcal, 18 g protein)

## 2022-10-06 NOTE — CONSULT NOTE ADULT - PROBLEM SELECTOR RECOMMENDATION 9
recent admission with urine culture positive for e.coli early September  UA on this admission with leukocytes, urine culture negative-on ceftriaxone  dementia and incontinent of stool and urine

## 2022-10-06 NOTE — CONSULT NOTE ADULT - PROBLEM SELECTOR RECOMMENDATION 2
creatinine initially elevated from prior baseline, but down trending  likely in setting of dehydration, UTI  avoid nephrotoxic agents

## 2022-10-06 NOTE — CONSULT NOTE ADULT - PROBLEM SELECTOR RECOMMENDATION 3
was at rehab as patient had more difficulty with getting around  poor po intake and reported weight loss   FAST 7C  would be hospice eligible

## 2022-10-06 NOTE — CONSULT NOTE ADULT - SUBJECTIVE AND OBJECTIVE BOX
HPI:  86 y/o M pmhx HTN, HLD, DM, alzheimer, dementia [facility]. Spoke with wife (divine) and daughter. Patient is normally A&Ox0, minimally responsive and needs help with all his ADLS. Today, patient was not responsive at all, and has been eating less and less of his food for the past few days. Family was told that he has had 50-60% of his food for the past few days and that he has been continuously losing weight. Otherwise, there are no reported fevers, chills, nausea, vomiting episodes. ROS is otherwise unattainable from patient, does not respond to verbal commands, but does respond to painful stimuli (positive nail-bed test).  (03 Oct 2022 21:28)    PERTINENT PM/SXH:   Hypertension    High cholesterol    Diabetes    Dementia    Alzheimers disease        FAMILY HISTORY:  FH: HTN (hypertension)      ITEMS NOT CHECKED ARE NOT PRESENT    SOCIAL HISTORY:   Significant other/partner: yes [ ]  Children: yes [ ]  Pentecostalism/Spirituality: Alevism  Substance hx:  [ ]   Tobacco hx:  [ ]   Alcohol hx: [ ]   Home Opioid hx:  [ ] I-Stop Reference No: Reference #: [id number]  Living Situation: [ ]Home  [ ]Long term care  [x ]Rehab [ ]Other    ADVANCE DIRECTIVES:    DNR  MOLST  [ ]  Living Will  [ ]   DECISION MAKER(s):  [ ] Health Care Proxy(s)  [x ] Surrogate(s)  [ ] Guardian           Name(s): Phone Number(s): Divine Rose (wife) [phone]    BASELINE (I)ADL(s) (prior to admission):  [place]er: [ ]Total  [ ] Moderate [ x]Dependent    Allergies    No Known Allergies    Intolerances    MEDICATIONS  (STANDING):  cefTRIAXone   IVPB 1000 milliGRAM(s) IV Intermittent every 24 hours  dextrose 5%. 1000 milliLiter(s) (75 mL/Hr) IV Continuous <Continuous>  donepezil 10 milliGRAM(s) Oral at bedtime  heparin   Injectable 5000 Unit(s) SubCutaneous every 12 hours  lactobacillus acidophilus 1 Tablet(s) Oral daily  memantine 10 milliGRAM(s) Oral two times a day  multivitamin 1 Tablet(s) Oral daily  pantoprazole    Tablet 40 milliGRAM(s) Oral before breakfast  simvastatin 20 milliGRAM(s) Oral at bedtime  tamsulosin 0.4 milliGRAM(s) Oral at bedtime    MEDICATIONS  (PRN):  melatonin 3 milliGRAM(s) Oral at bedtime PRN Insomnia    PRESENT SYMPTOMS: [x ]Unable to obtain due to poor mentation   Source if other than patient:  [ ]Family   [ ]Team     Pain: [ ] yes [ ] no  QOL impact -   Location -                    Aggravating factors -  Quality -  Radiation -  Timing-  Severity (0-10 scale):  Minimal acceptable level (0-10 scale):     PAIN AD Score: 0    [email or web address] (press ctrl +  left click to view)    Dyspnea:                           [ ]Mild [ ]Moderate [ ]Severe  Anxiety:                             [ ]Mild [ ]Moderate [ ]Severe  Fatigue:                             [ ]Mild [ ]Moderate [ ]Severe  Nausea:                             [ ]Mild [ ]Moderate [ ]Severe  Loss of appetite:              [ ]Mild [ ]Moderate [ ]Severe  Constipation:                    [ ]Mild [ ]Moderate [ ]Severe    Other Symptoms:  [ ]All other review of systems negative     Karnofsky Performance Score/Palliative Performance Status Version 2:      30   %    http://npcrc.org/files/news/palliative_performance_scale_ppsv2.pdf  PHYSICAL EXAM:  Vital Signs Last 24 Hrs  T(C): 36.8 (06 Oct 2022 12:38), Max: 37.2 (05 Oct 2022 23:55)  T(F): 98.3 (06 Oct 2022 12:38), Max: 99 (05 Oct 2022 23:55)  HR: 67 (06 Oct 2022 12:38) (67 - 76)  BP: 109/69 (06 Oct 2022 12:38) (104/67 - 117/75)  BP(mean): --  RR: 18 (06 Oct 2022 12:38) (17 - 18)  SpO2: 98% (06 Oct 2022 12:38) (95% - 100%)    Parameters below as of 06 Oct 2022 05:08  Patient On (Oxygen Delivery Method): room air     I&O's Summary  GENERAL:  [ x]Alert  [ ]Oriented x   [ ]Lethargic  [ ]Cachexia  [ ]Unarousable  [ ]Verbal  [ x]Non-Verbal  Behavioral:   [ ] Anxiety  [ ] Delirium [ ] Agitation [ ] Other  HEENT:  [ ]Normal   [x ]Dry mouth   [ ]ET Tube/Trach  [ ]Oral lesions  PULMONARY:   [ ]Clear [ ]Tachypnea  [ ]Audible excessive secretions   [ ]Rhonchi        [ ]Right [ ]Left [ ]Bilateral  [ ]Crackles        [ ]Right [ ]Left [ ]Bilateral  [ ]Wheezing     [ ]Right [ ]Left [ ]Bilateral  CARDIOVASCULAR:    [x ]Regular [ ]Irregular [ ]Tachy  [ ]Eagle [ ]Murmur [ ]Other  GASTROINTESTINAL:  [x ]Soft  [ ]Distended   [ ]+BS  [x ]Non tender [ ]Tender  [ ]PEG [ ]OGT/ NGT  Last BM: 10/4/22 GENITOURINARY:  [ ]Normal [ x] Incontinent   [ ]Oliguria/Anuria   [ ]Heath  MUSCULOSKELETAL:   [ ]Normal   [ x]Weakness  [ ]Bed/Wheelchair bound [ ]Edema  NEUROLOGIC:   [ ]No focal deficits  [x ] Cognitive impairment  [ ] Dysphagia [ ]Dysarthria [ ] Paresis [ ]Other   SKIN:   [ x]Normal   [ ]Pressure ulcer(s)  [ ]Rash    CRITICAL CARE:  [ ] Shock Present  [ ]Septic [ ]Cardiogenic [ ]Neurologic [ ]Hypovolemic  [ ]  Vasopressors [ ]  Inotropes   [ ] Respiratory failure present [ ] mechanical ventilation [ ] non-invasive ventilatory support [ ] High flow  [ ] Acute  [ ] Chronic [ ] Hypoxic  [ ] Hypercarbic [ ] Other  [ ] Other organ failure     LABS:                        12.2   6.60  )-----------( 153      ( 06 Oct 2022 06:10 )             41.4   10-06    150<H>  |  120<H>  |  42<H>  ----------------------------<  110<H>  4.3   |  23  |  1.59<H>    Ca    8.6      06 Oct 2022 06:10    Culture - Urine (10.03.22 @ 13:10)    Specimen Source: Clean Catch Clean Catch (Midstream)    Culture Results:   >=3 organisms. Probable collection contamination.    Urinalysis (10.03.22 @ 13:10)    pH Urine: 5.0    Glucose Qualitative, Urine: Negative mg/dL    Blood, Urine: Large    Color: Yellow    Urine Appearance: very cloudy    Bilirubin: Negative    Ketone - Urine: Trace    Specific Gravity: 1.025    Protein, Urine: 100 mg/dL    Urobilinogen: Negative mg/dL    Nitrite: Negative    Leukocyte Esterase Concentration: Moderate    RADIOLOGY & ADDITIONAL STUDIES:   < from: Xray Chest 1 View- PORTABLE-Urgent (10.03.22 @ 15:01) >  IMPRESSION: Clear lungs with no acute cardiopulmonary abnormalities.  --- End of Report ---  < end of copied text >    PROTEIN CALORIE MALNUTRITION PRESENT: [x ] Yes [ ] No  [x ] PPSV2 < or = to 30% [ ] significant weight loss  [x ] poor nutritional intake [ ] catabolic state [ ] anasarca     Artificial Nutrition [ ]     REFERRALS:   [ ]Chaplaincy  [ ] Hospice  [ ]Child Life  [ ]Social Work  [ ]Case management [ ]Holistic Therapy     Goals of Care Document:   Care Coordination Assessment 201 [C. Provider] (10-04-22 @ 12:37)

## 2022-10-06 NOTE — PROGRESS NOTE ADULT - SUBJECTIVE AND OBJECTIVE BOX
Patient is a 87y old  Male who presents with a chief complaint of decrease po intake (04 Oct 2022 13:33)    HPI:  86 y/o M pmhx HTN, HLD, DM, alzheimer, dementia sent from [facility] for worsening responsiveness. Spoke with wife (divine) and daughter. Patient is normally A&Ox0, minimally responsive and needs help with all his ADLS. Today, patient was not responsive at all, and has been eating less and less of his food for the past few days. Family was told that he has had 50-60% of his food for the past few days and that he has been continuously losing weight. Otherwise, there are no reported fevers, chills, nausea, vomiting episodes. ROS is otherwise unattainable from patient, does not respond to verbal commands, but does respond to painful stimuli (positive nail-bed test).  (03 Oct 2022 21:28)    SUBJECTIVE & OBJECTIVE: Pt seen and examined at bedside. remains with  decreased oral intake today     PHYSICAL EXAM:    Vital Signs Last 24 Hrs  T(C): 36.8 (06 Oct 2022 12:38), Max: 37.2 (05 Oct 2022 23:55)  T(F): 98.3 (06 Oct 2022 12:38), Max: 99 (05 Oct 2022 23:55)  HR: 67 (06 Oct 2022 12:38) (67 - 76)  BP: 109/69 (06 Oct 2022 12:38) (104/67 - 117/75)  BP(mean): --  RR: 18 (06 Oct 2022 12:38) (17 - 18)  SpO2: 98% (06 Oct 2022 12:38) (95% - 100%)    Parameters below as of 06 Oct 2022 05:08  Patient On (Oxygen Delivery Method): room air          GENERAL: very thin and chronically ill appearing   HEAD:  Atraumatic, Normocephalic  EYES: EOMI, PERRLA, conjunctiva and sclera clear  ENMT: Moist mucous membranes, edentulous  NECK: Supple, No JVD  NERVOUS SYSTEM:  not following commands, Motor Strength 4/5 B/L upper and lower extremities; DTRs 2+ intact and symmetric  CHEST/LUNG: poor air entry bilaterally   HEART: Regular rate and rhythm; No murmurs, rubs, or gallops  ABDOMEN: Soft, Nontender, Nondistended; Bowel sounds present  EXTREMITIES:  2+ Peripheral Pulses, No clubbing, cyanosis, or edema  LABS:                                   12.2   6.60  )-----------( 153      ( 06 Oct 2022 06:10 )             41.4     10-06    150<H>  |  120<H>  |  42<H>  ----------------------------<  110<H>  4.3   |  23  |  1.59<H>    Ca    8.6      06 Oct 2022 06:10                RECENT CULTURES: blood cultures are pending, urine culture is growing >3 organisms   RADIOLOGY & ADDITIONAL TESTS:

## 2022-10-06 NOTE — DIETITIAN INITIAL EVALUATION ADULT - OTHER CALCULATIONS
Ht (cm): 180.3cm  Wt (kg): 65.8kg (dosing weight 10/03)  BMI: 20.2    IBW:  78.0kg +/- 10%  %IBW: 84%  UBW: 67.8kg  %UBW: 97%

## 2022-10-06 NOTE — PROGRESS NOTE ADULT - ASSESSMENT
86 y/o M pmhx HTN, HLD, DM, alzheimer's dementia, recent [facility], found to have UTI and severe dehydration.     Problem/Plan - 1:  ·  Problem: Acute cystitis.   ·  Plan: UA + with leuk esterases and wbcs  complete  ceftriaxone today    -urine culture is growing > 3 organism. will repeat       Problem/Plan - 2:  ·  Problem: RADHA (acute kidney injury).   ·  Plan: Cr elevated to 2.68 from 1.15  Likely from decreased po intake and severe dehydration. trending down   - Renal US is pending     Problem/Plan - 3:  ·  Problem: Functional Quadriplegia  ·  Plan: weight loss, decreased po intake and now with hypokalemia and hypernatremia   - speech and swallow done  - patient may have supervised feeds Puree with thickened liquids   - discussed patient's prognosis with family, they would like patient to be d/c to LTC when ready     Problem/Plan - 4:  ·  Problem: Hypernatremia  ·  Plan: from decreased po intake and volume contraction   - change fluids to D5 at 75 cc/hr   - following bmp     Problem/Plan - 5:  ·  Problem: Hypertension.   ·  Plan: hold amlodipine for now    Problem/Plan - 6:  ·  Problem: Alzheimer's dementia.   ·  Plan: c/w memantine and donepazil.    Problem/Plan - 7:  ·  Problem: Anemia  - baseline hgb 12.0  on admission the patient was volume contracted and hgb was 15.  His current hgb of 11 likely represents his normal baseline. Doubt acute blood loss.  check hgb in am        Need for prophylactic measure.   ·  Plan: diet: pureed with thickened liquids   dvt ppx: heparin subq  dispo: pending improvement in cystitis and renal function  ethics: full code, spoke with pts dtr and wife. will have palliative talk to family as pt is eating poorly with dementia

## 2022-10-06 NOTE — CONSULT NOTE ADULT - PROBLEM SELECTOR RECOMMENDATION 5
Clinical evidence indicates that the patient has Severe protein calorie malnutrition/ 3rd degree    In context of Chronic Illness (>1 month)    Energy/Food intake <50% of estimated energy requirement >5 days  Weight loss: Moderate - severe (lbs lost recently)  Body Fat loss:  temporal wasting     Muscle mass loss: Severe  (Skin failure/pressure ulcers)   Strength: weakened severe (bedbound)    Recommend:   pleasure feeds as tolerated - aspiration precautions, careful hand-feeding, teaching to caregivers  nutritional supplements as tolerated, nutrition consult    trial of NG tube feeds vs PEG tube feeds Clinical evidence indicates that the patient has Severe protein calorie malnutrition/ 3rd degree    In context of Chronic Illness (>1 month)    Energy/Food intake <50% of estimated energy requirement >5 days  Weight loss: Moderate - severe (lbs lost recently)  Body Fat loss:  temporal wasting     Muscle mass loss: Severe  (Skin failure/pressure ulcers)   Strength: weakened severe (bedbound)    Recommend:   pleasure feeds as tolerated - aspiration precautions, careful hand-feeding, teaching to caregivers  nutritional supplements as tolerated, dietary consult appreciated

## 2022-10-06 NOTE — CONSULT NOTE ADULT - ASSESSMENT
87 year old male PMH of dementia, DM and HTN sent from rehab to ED for decreased responsiveness.  Patient with reported reduced po intake recently and weight loss.  Palliative care consulted for GOC in the setting of advanced dementia with poor po intake.

## 2022-10-06 NOTE — CONSULT NOTE ADULT - CONVERSATION DETAILS
Spoke with patient's wife, Leon and his daughter.  They said patient has had progressive decline since about 1 week prior to his hospitalization in September of this year with poor po intake.  Discussed that patient has dementia which is chronic and progressive and decreased appetite may be multifactorial in setting of multiple recent infections (UTI and COVID), but can also be seen as people approach the end of life.  Discussed enteral feeding tubes vs. pleasure feeds and discussed  patient's with advanced dementia with feeding tubes are at risk for aspiration, infection, increased use of physical restraints, agitation, development of pressure ulcers and they do not improve the quality of life.  Recommended continued careful handfeeding and discussed patient may continue with poor po intake.  No advanced directives done prior, no living san and Leon said she thinks she would want patient to be resuscitated if necessary.  We discussed that should patient's heart stop, interventions such as CPR would likely not change the patient's outcome and prolong suffering.  Expressed importance of ongoing GOC discussions and will continue to address.  Patient remains full code.

## 2022-10-07 LAB
ANION GAP SERPL CALC-SCNC: 6 MMOL/L — SIGNIFICANT CHANGE UP (ref 5–17)
BUN SERPL-MCNC: 31 MG/DL — HIGH (ref 7–23)
CALCIUM SERPL-MCNC: 8.3 MG/DL — LOW (ref 8.5–10.1)
CHLORIDE SERPL-SCNC: 116 MMOL/L — HIGH (ref 96–108)
CO2 SERPL-SCNC: 23 MMOL/L — SIGNIFICANT CHANGE UP (ref 22–31)
CREAT SERPL-MCNC: 1.36 MG/DL — HIGH (ref 0.5–1.3)
EGFR: 51 ML/MIN/1.73M2 — LOW
GLUCOSE SERPL-MCNC: 105 MG/DL — HIGH (ref 70–99)
HCT VFR BLD CALC: 39.9 % — SIGNIFICANT CHANGE UP (ref 39–50)
HGB BLD-MCNC: 11.8 G/DL — LOW (ref 13–17)
MCHC RBC-ENTMCNC: 25.1 PG — LOW (ref 27–34)
MCHC RBC-ENTMCNC: 29.6 G/DL — LOW (ref 32–36)
MCV RBC AUTO: 84.9 FL — SIGNIFICANT CHANGE UP (ref 80–100)
NRBC # BLD: 0 /100 WBCS — SIGNIFICANT CHANGE UP (ref 0–0)
PLATELET # BLD AUTO: SIGNIFICANT CHANGE UP K/UL (ref 150–400)
POTASSIUM SERPL-MCNC: 3.9 MMOL/L — SIGNIFICANT CHANGE UP (ref 3.5–5.3)
POTASSIUM SERPL-SCNC: 3.9 MMOL/L — SIGNIFICANT CHANGE UP (ref 3.5–5.3)
RBC # BLD: 4.7 M/UL — SIGNIFICANT CHANGE UP (ref 4.2–5.8)
RBC # FLD: 15.7 % — HIGH (ref 10.3–14.5)
SODIUM SERPL-SCNC: 145 MMOL/L — SIGNIFICANT CHANGE UP (ref 135–145)
WBC # BLD: 5.62 K/UL — SIGNIFICANT CHANGE UP (ref 3.8–10.5)
WBC # FLD AUTO: 5.62 K/UL — SIGNIFICANT CHANGE UP (ref 3.8–10.5)

## 2022-10-07 PROCEDURE — 76775 US EXAM ABDO BACK WALL LIM: CPT | Mod: 26

## 2022-10-07 PROCEDURE — 99233 SBSQ HOSP IP/OBS HIGH 50: CPT

## 2022-10-07 RX ADMIN — SODIUM CHLORIDE 75 MILLILITER(S): 9 INJECTION, SOLUTION INTRAVENOUS at 17:07

## 2022-10-07 RX ADMIN — MEMANTINE HYDROCHLORIDE 10 MILLIGRAM(S): 10 TABLET ORAL at 05:18

## 2022-10-07 RX ADMIN — Medication 1 TABLET(S): at 11:57

## 2022-10-07 RX ADMIN — DONEPEZIL HYDROCHLORIDE 10 MILLIGRAM(S): 10 TABLET, FILM COATED ORAL at 21:32

## 2022-10-07 RX ADMIN — PANTOPRAZOLE SODIUM 40 MILLIGRAM(S): 20 TABLET, DELAYED RELEASE ORAL at 05:18

## 2022-10-07 RX ADMIN — HEPARIN SODIUM 5000 UNIT(S): 5000 INJECTION INTRAVENOUS; SUBCUTANEOUS at 17:22

## 2022-10-07 RX ADMIN — HEPARIN SODIUM 5000 UNIT(S): 5000 INJECTION INTRAVENOUS; SUBCUTANEOUS at 05:19

## 2022-10-07 RX ADMIN — SODIUM CHLORIDE 75 MILLILITER(S): 9 INJECTION, SOLUTION INTRAVENOUS at 05:18

## 2022-10-07 RX ADMIN — SIMVASTATIN 20 MILLIGRAM(S): 20 TABLET, FILM COATED ORAL at 21:34

## 2022-10-07 RX ADMIN — MEMANTINE HYDROCHLORIDE 10 MILLIGRAM(S): 10 TABLET ORAL at 17:21

## 2022-10-07 NOTE — PROGRESS NOTE ADULT - ASSESSMENT
86 y/o M pmhx HTN, HLD, DM, alzheimer's dementia, recent [facility], found to have UTI and severe dehydration.     Problem/Plan - 1:  ·  Problem: Acute cystitis.   ·  Plan: UA + with leuk esterases and wbcs  complete  ceftriaxone t   -urine culture is growing > 3 organism. will repeat but remains stable off abx       Problem/Plan - 2:  ·  Problem: RADHA (acute kidney injury).   ·  Plan: Cr elevated to 2.68 from 1.15  Likely from decreased po intake and severe dehydration. trending down       Problem/Plan - 3:  ·  Problem: Functional Quadriplegia  ·  Plan: weight loss, decreased po intake and now with hypokalemia and hypernatremia   - speech and swallow done  - patient may have supervised feeds Puree with thickened liquids   - discussed patient's prognosis with family, they would like patient to be d/c to LTC when ready     Problem/Plan - 4:  ·  Problem: Hypernatremia  ·  Plan: from decreased po intake and volume contraction   - change fluids to D5 at 75 cc/hr   - following bmp     Problem/Plan - 5:  ·  Problem: Hypertension.   ·  Plan: hold amlodipine for now    Problem/Plan - 6:  ·  Problem: Alzheimer's dementia.   ·  Plan: c/w memantine and donepazil.    Problem/Plan - 7:  ·  Problem: Anemia  - baseline hgb 12.0  on admission the patient was volume contracted and hgb was 15.  His current hgb of 11 likely represents his normal baseline. Doubt acute blood loss       Need for prophylactic measure.   ·  Plan: diet: pureed with thickened liquids   dvt ppx: heparin subq  dispo: stable for dc to [facility]. palliative consult done and discussed GOC with family. remains full code. understands that not eating is a progression of the dementia and will discuss option with family   ethics: full code, spoke with pts dtr and wife.

## 2022-10-07 NOTE — PROGRESS NOTE ADULT - SUBJECTIVE AND OBJECTIVE BOX
Patient is a 87y old  Male who presents with a chief complaint of decrease po intake (04 Oct 2022 13:33)    HPI:  86 y/o M pmhx HTN, HLD, DM, alzheimer, dementia sent from [facility] for worsening responsiveness. Spoke with wife (divine) and daughter. Patient is normally A&Ox0, minimally responsive and needs help with all his ADLS. Today, patient was not responsive at all, and has been eating less and less of his food for the past few days. Family was told that he has had 50-60% of his food for the past few days and that he has been continuously losing weight. Otherwise, there are no reported fevers, chills, nausea, vomiting episodes. ROS is otherwise unattainable from patient, does not respond to verbal commands, but does respond to painful stimuli (positive nail-bed test).  (03 Oct 2022 21:28)    SUBJECTIVE & OBJECTIVE: Pt seen and examined at bedside. eating mildly improved     PHYSICAL EXAM:    Vital Signs Last 24 Hrs  T(C): 36.7 (07 Oct 2022 05:33), Max: 37 (06 Oct 2022 17:22)  T(F): 98.1 (07 Oct 2022 05:33), Max: 98.6 (06 Oct 2022 17:22)  HR: 70 (07 Oct 2022 05:33) (64 - 74)  BP: 109/68 (07 Oct 2022 05:33) (109/68 - 123/58)  BP(mean): --  RR: 16 (07 Oct 2022 05:33) (16 - 18)  SpO2: 100% (07 Oct 2022 05:33) (97% - 100%)              GENERAL: very thin and chronically ill appearing   HEAD:  Atraumatic, Normocephalic  EYES: EOMI, PERRLA, conjunctiva and sclera clear  ENMT: Moist mucous membranes, edentulous  NECK: Supple, No JVD  NERVOUS SYSTEM:  not following commands, Motor Strength 4/5 B/L upper and lower extremities; DTRs 2+ intact and symmetric  CHEST/LUNG: poor air entry bilaterally   HEART: Regular rate and rhythm; No murmurs, rubs, or gallops  ABDOMEN: Soft, Nontender, Nondistended; Bowel sounds present  EXTREMITIES:  2+ Peripheral Pulses, No clubbing, cyanosis, or edema  LABS:                                              x      5.62  )-----------( x        ( 07 Oct 2022 06:25 )             x        10-07    145  |  116<H>  |  31<H>  ----------------------------<  105<H>  3.9   |  23  |  1.36<H>    Ca    8.3<L>      07 Oct 2022 06:25              Ca    8.6      06 Oct 2022 06:10                RECENT CULTURES: blood cultures are pending, urine culture is growing >3 organisms   RADIOLOGY & ADDITIONAL TESTS:

## 2022-10-08 LAB
CULTURE RESULTS: SIGNIFICANT CHANGE UP
SPECIMEN SOURCE: SIGNIFICANT CHANGE UP

## 2022-10-08 PROCEDURE — 99232 SBSQ HOSP IP/OBS MODERATE 35: CPT

## 2022-10-08 RX ORDER — PANTOPRAZOLE SODIUM 20 MG/1
40 TABLET, DELAYED RELEASE ORAL DAILY
Refills: 0 | Status: DISCONTINUED | OUTPATIENT
Start: 2022-10-08 | End: 2022-10-19

## 2022-10-08 RX ADMIN — HEPARIN SODIUM 5000 UNIT(S): 5000 INJECTION INTRAVENOUS; SUBCUTANEOUS at 05:39

## 2022-10-08 RX ADMIN — SODIUM CHLORIDE 75 MILLILITER(S): 9 INJECTION, SOLUTION INTRAVENOUS at 15:01

## 2022-10-08 RX ADMIN — MEMANTINE HYDROCHLORIDE 10 MILLIGRAM(S): 10 TABLET ORAL at 05:39

## 2022-10-08 RX ADMIN — Medication 1 TABLET(S): at 15:02

## 2022-10-08 RX ADMIN — DONEPEZIL HYDROCHLORIDE 10 MILLIGRAM(S): 10 TABLET, FILM COATED ORAL at 21:28

## 2022-10-08 RX ADMIN — TAMSULOSIN HYDROCHLORIDE 0.4 MILLIGRAM(S): 0.4 CAPSULE ORAL at 21:24

## 2022-10-08 RX ADMIN — MEMANTINE HYDROCHLORIDE 10 MILLIGRAM(S): 10 TABLET ORAL at 17:46

## 2022-10-08 RX ADMIN — PANTOPRAZOLE SODIUM 40 MILLIGRAM(S): 20 TABLET, DELAYED RELEASE ORAL at 15:03

## 2022-10-08 RX ADMIN — HEPARIN SODIUM 5000 UNIT(S): 5000 INJECTION INTRAVENOUS; SUBCUTANEOUS at 17:46

## 2022-10-08 RX ADMIN — SIMVASTATIN 20 MILLIGRAM(S): 20 TABLET, FILM COATED ORAL at 21:24

## 2022-10-08 NOTE — PROGRESS NOTE ADULT - ASSESSMENT
88 y/o M pmhx HTN, HLD, DM, alzheimer's dementia, recent [facility], found to have UTI and severe dehydration.     Problem/Plan - 1:  ·  Problem: Acute cystitis.   ·  Plan: UA + with leuk esterases and wbcs  completed  ceftriaxone   -urine culture is growing > 3 organism. will repeat but remains stable off abx       Problem/Plan - 2:  ·  Problem: RADHA (acute kidney injury).   ·  Plan: Cr elevated to 2.68 from 1.15  Likely from decreased po intake and severe dehydration. trending down       Problem/Plan - 3:  ·  Problem: Functional Quadriplegia  ·  Plan: weight loss, decreased po intake and now with hypokalemia and hypernatremia   - speech and swallow done  - patient may have supervised feeds Puree with thickened liquids   - discussed patient's prognosis with family, they would like patient to be d/c to LTC when ready     Problem/Plan - 4:  ·  Problem: Hypernatremia  ·  Plan: from decreased po intake and volume contraction   - change fluids to D5 at 75 cc/hr   - following bmp     Problem/Plan - 5:  ·  Problem: Hypertension.   ·  Plan: hold amlodipine for now    Problem/Plan - 6:  ·  Problem: Alzheimer's dementia.   ·  Plan: c/w memantine and donepazil.    Problem/Plan - 7:  ·  Problem: Anemia  - baseline hgb 12.0  on admission the patient was volume contracted and hgb was 15.  His current hgb of 11 likely represents his normal baseline. Doubt acute blood loss       Need for prophylactic measure.   ·  Plan: diet: pureed with thickened liquids   dvt ppx: heparin subq  dispo: stable for dc to [facility]. palliative consult done and discussed GOC with family. remains full code. understands that not eating is a progression of the dementia and will discuss option with family   ethics: full code, spoke with pts dtr and wife.   dc planning to [facility]/LTC

## 2022-10-09 LAB
ANION GAP SERPL CALC-SCNC: 7 MMOL/L — SIGNIFICANT CHANGE UP (ref 5–17)
BUN SERPL-MCNC: 15 MG/DL — SIGNIFICANT CHANGE UP (ref 7–23)
CALCIUM SERPL-MCNC: 8.2 MG/DL — LOW (ref 8.5–10.1)
CHLORIDE SERPL-SCNC: 110 MMOL/L — HIGH (ref 96–108)
CO2 SERPL-SCNC: 20 MMOL/L — LOW (ref 22–31)
CREAT SERPL-MCNC: 1.24 MG/DL — SIGNIFICANT CHANGE UP (ref 0.5–1.3)
EGFR: 57 ML/MIN/1.73M2 — LOW
GLUCOSE SERPL-MCNC: 106 MG/DL — HIGH (ref 70–99)
HCT VFR BLD CALC: 36.8 % — LOW (ref 39–50)
HGB BLD-MCNC: 11.3 G/DL — LOW (ref 13–17)
MCHC RBC-ENTMCNC: 25.6 PG — LOW (ref 27–34)
MCHC RBC-ENTMCNC: 30.7 G/DL — LOW (ref 32–36)
MCV RBC AUTO: 83.4 FL — SIGNIFICANT CHANGE UP (ref 80–100)
NRBC # BLD: 0 /100 WBCS — SIGNIFICANT CHANGE UP (ref 0–0)
PLATELET # BLD AUTO: 86 K/UL — LOW (ref 150–400)
POTASSIUM SERPL-MCNC: 3.9 MMOL/L — SIGNIFICANT CHANGE UP (ref 3.5–5.3)
POTASSIUM SERPL-SCNC: 3.9 MMOL/L — SIGNIFICANT CHANGE UP (ref 3.5–5.3)
RBC # BLD: 4.41 M/UL — SIGNIFICANT CHANGE UP (ref 4.2–5.8)
RBC # FLD: 15.6 % — HIGH (ref 10.3–14.5)
SODIUM SERPL-SCNC: 137 MMOL/L — SIGNIFICANT CHANGE UP (ref 135–145)
WBC # BLD: 4.24 K/UL — SIGNIFICANT CHANGE UP (ref 3.8–10.5)
WBC # FLD AUTO: 4.24 K/UL — SIGNIFICANT CHANGE UP (ref 3.8–10.5)

## 2022-10-09 PROCEDURE — 99232 SBSQ HOSP IP/OBS MODERATE 35: CPT

## 2022-10-09 RX ADMIN — HEPARIN SODIUM 5000 UNIT(S): 5000 INJECTION INTRAVENOUS; SUBCUTANEOUS at 17:10

## 2022-10-09 RX ADMIN — DONEPEZIL HYDROCHLORIDE 10 MILLIGRAM(S): 10 TABLET, FILM COATED ORAL at 21:39

## 2022-10-09 RX ADMIN — SODIUM CHLORIDE 75 MILLILITER(S): 9 INJECTION, SOLUTION INTRAVENOUS at 04:47

## 2022-10-09 RX ADMIN — TAMSULOSIN HYDROCHLORIDE 0.4 MILLIGRAM(S): 0.4 CAPSULE ORAL at 21:40

## 2022-10-09 RX ADMIN — Medication 1 TABLET(S): at 11:52

## 2022-10-09 RX ADMIN — HEPARIN SODIUM 5000 UNIT(S): 5000 INJECTION INTRAVENOUS; SUBCUTANEOUS at 05:18

## 2022-10-09 RX ADMIN — PANTOPRAZOLE SODIUM 40 MILLIGRAM(S): 20 TABLET, DELAYED RELEASE ORAL at 11:51

## 2022-10-09 RX ADMIN — MEMANTINE HYDROCHLORIDE 10 MILLIGRAM(S): 10 TABLET ORAL at 05:15

## 2022-10-09 RX ADMIN — SIMVASTATIN 20 MILLIGRAM(S): 20 TABLET, FILM COATED ORAL at 21:39

## 2022-10-09 RX ADMIN — MEMANTINE HYDROCHLORIDE 10 MILLIGRAM(S): 10 TABLET ORAL at 17:11

## 2022-10-09 NOTE — PROGRESS NOTE ADULT - SUBJECTIVE AND OBJECTIVE BOX
Patient is a 87y old  Male who presents with a chief complaint of decrease po intake (04 Oct 2022 13:33)    HPI:  86 y/o M pmhx HTN, HLD, DM, alzheimer, dementia sent from [facility] for worsening responsiveness. Spoke with wife (divine) and daughter. Patient is normally A&Ox0, minimally responsive and needs help with all his ADLS. Today, patient was not responsive at all, and has been eating less and less of his food for the past few days. Family was told that he has had 50-60% of his food for the past few days and that he has been continuously losing weight. Otherwise, there are no reported fevers, chills, nausea, vomiting episodes. ROS is otherwise unattainable from patient, does not respond to verbal commands, but does respond to painful stimuli (positive nail-bed test).  (03 Oct 2022 21:28)    SUBJECTIVE & OBJECTIVE: Pt seen and examined at bedside. eating mildly improved     PHYSICAL EXAM:    Vital Signs Last 24 Hrs  T(C): 36.4 (09 Oct 2022 05:27), Max: 36.9 (08 Oct 2022 12:25)  T(F): 97.6 (09 Oct 2022 05:27), Max: 98.5 (08 Oct 2022 12:25)  HR: 65 (09 Oct 2022 05:27) (65 - 82)  BP: 104/61 (09 Oct 2022 05:27) (98/62 - 111/63)  BP(mean): --  RR: 18 (09 Oct 2022 05:27) (17 - 18)  SpO2: 95% (09 Oct 2022 05:27) (95% - 96%)    Parameters below as of 09 Oct 2022 05:27  Patient On (Oxygen Delivery Method): room air              GENERAL: very thin and chronically ill appearing   HEAD:  Atraumatic, Normocephalic  EYES: EOMI, PERRLA, conjunctiva and sclera clear  ENMT: Moist mucous membranes, edentulous  NECK: Supple, No JVD  NERVOUS SYSTEM:  not following commands, Motor Strength 4/5 B/L upper and lower extremities; DTRs 2+ intact and symmetric  CHEST/LUNG: poor air entry bilaterally   HEART: Regular rate and rhythm; No murmurs, rubs, or gallops  ABDOMEN: Soft, Nontender, Nondistended; Bowel sounds present  EXTREMITIES:  2+ Peripheral Pulses, No clubbing, cyanosis, or edema  LABS:

## 2022-10-09 NOTE — PROGRESS NOTE ADULT - ASSESSMENT
86 y/o M pmhx HTN, HLD, DM, alzheimer's dementia, recent COVID [facility], found to have UTI and severe dehydration.     Problem/Plan - 1:  ·  Problem: Acute cystitis.   ·  Plan: UA + with leuk esterases and wbcs  completed  ceftriaxone   -urine culture is growing > 3 organism.  repeat via straight cath growing candida but but remains stable off abx and antifungals. Discussed with Dr. Leroy and no need to treat       Problem/Plan - 2:  ·  Problem: RADHA (acute kidney injury).   ·  Plan: Cr elevated to 2.68 from 1.15  Likely from decreased po intake and severe dehydration. trending down       Problem/Plan - 3:  ·  Problem: Functional Quadriplegia  ·  Plan: weight loss, decreased po intake and now with hypokalemia and hypernatremia   - speech and swallow done  - patient may have supervised feeds Puree with thickened liquids   - discussed patient's prognosis with family, they would like patient to be d/c to LTC when ready     Problem/Plan - 4:  ·  Problem: Hypernatremia  ·  Plan: from decreased po intake and volume contraction   - change fluids to D5 at 75 cc/hr   - following bmp     Problem/Plan - 5:  ·  Problem: Hypertension.   ·  Plan: hold amlodipine for now    Problem/Plan - 6:  ·  Problem: Alzheimer's dementia.   ·  Plan: c/w memantine and donepazil.    Problem/Plan - 7:  ·  Problem: Anemia  - baseline hgb 12.0  on admission the patient was volume contracted and hgb was 15.  His current hgb of 11 likely represents his normal baseline. Doubt acute blood loss       Need for prophylactic measure.   ·  Plan: diet: pureed with thickened liquids   dvt ppx: heparin subq  dispo: stable for dc to [facility]. palliative consult done and discussed GOC with family. remains full code. understands that not eating is a progression of the dementia and will discuss option with family   ethics: full code, spoke with pts dtr and wife.   dc planning to [facility]TC

## 2022-10-10 LAB
ANION GAP SERPL CALC-SCNC: 7 MMOL/L — SIGNIFICANT CHANGE UP (ref 5–17)
BUN SERPL-MCNC: 11 MG/DL — SIGNIFICANT CHANGE UP (ref 7–23)
CALCIUM SERPL-MCNC: 8.1 MG/DL — LOW (ref 8.5–10.1)
CHLORIDE SERPL-SCNC: 110 MMOL/L — HIGH (ref 96–108)
CO2 SERPL-SCNC: 16 MMOL/L — LOW (ref 22–31)
CREAT SERPL-MCNC: 1.33 MG/DL — HIGH (ref 0.5–1.3)
EGFR: 52 ML/MIN/1.73M2 — LOW
GLUCOSE SERPL-MCNC: 105 MG/DL — HIGH (ref 70–99)
HCT VFR BLD CALC: 37.2 % — LOW (ref 39–50)
HEPARIN-PF4 AB RESULT: <0.6 U/ML — SIGNIFICANT CHANGE UP (ref 0–0.9)
HGB BLD-MCNC: 11.4 G/DL — LOW (ref 13–17)
MCHC RBC-ENTMCNC: 25.7 PG — LOW (ref 27–34)
MCHC RBC-ENTMCNC: 30.6 G/DL — LOW (ref 32–36)
MCV RBC AUTO: 84 FL — SIGNIFICANT CHANGE UP (ref 80–100)
NRBC # BLD: 0 /100 WBCS — SIGNIFICANT CHANGE UP (ref 0–0)
PF4 HEPARIN CMPLX AB SER-ACNC: NEGATIVE — SIGNIFICANT CHANGE UP
PLATELET # BLD AUTO: 90 K/UL — LOW (ref 150–400)
POTASSIUM SERPL-MCNC: 4.1 MMOL/L — SIGNIFICANT CHANGE UP (ref 3.5–5.3)
POTASSIUM SERPL-SCNC: 4.1 MMOL/L — SIGNIFICANT CHANGE UP (ref 3.5–5.3)
RBC # BLD: 4.43 M/UL — SIGNIFICANT CHANGE UP (ref 4.2–5.8)
RBC # FLD: 15.7 % — HIGH (ref 10.3–14.5)
SODIUM SERPL-SCNC: 133 MMOL/L — LOW (ref 135–145)
WBC # BLD: 4.8 K/UL — SIGNIFICANT CHANGE UP (ref 3.8–10.5)
WBC # FLD AUTO: 4.8 K/UL — SIGNIFICANT CHANGE UP (ref 3.8–10.5)

## 2022-10-10 PROCEDURE — 99232 SBSQ HOSP IP/OBS MODERATE 35: CPT

## 2022-10-10 RX ADMIN — DONEPEZIL HYDROCHLORIDE 10 MILLIGRAM(S): 10 TABLET, FILM COATED ORAL at 22:52

## 2022-10-10 RX ADMIN — TAMSULOSIN HYDROCHLORIDE 0.4 MILLIGRAM(S): 0.4 CAPSULE ORAL at 22:52

## 2022-10-10 RX ADMIN — MEMANTINE HYDROCHLORIDE 10 MILLIGRAM(S): 10 TABLET ORAL at 18:45

## 2022-10-10 RX ADMIN — Medication 1 TABLET(S): at 12:29

## 2022-10-10 RX ADMIN — HEPARIN SODIUM 5000 UNIT(S): 5000 INJECTION INTRAVENOUS; SUBCUTANEOUS at 05:34

## 2022-10-10 RX ADMIN — SIMVASTATIN 20 MILLIGRAM(S): 20 TABLET, FILM COATED ORAL at 22:52

## 2022-10-10 RX ADMIN — Medication 1 TABLET(S): at 12:28

## 2022-10-10 RX ADMIN — MEMANTINE HYDROCHLORIDE 10 MILLIGRAM(S): 10 TABLET ORAL at 05:33

## 2022-10-10 RX ADMIN — PANTOPRAZOLE SODIUM 40 MILLIGRAM(S): 20 TABLET, DELAYED RELEASE ORAL at 12:29

## 2022-10-10 NOTE — CHART NOTE - NSCHARTNOTEFT_GEN_A_CORE
Pt admitted from [facility] for worsening responsiveness, decreased PO intake, continued wt loss; found c UTI, severe dehydration, RADHA, hyponatremia, hypokalemia, anemia. Pt is dependent for all ADLs; recent COVID illness.  PMHx includes dementia, HTN, HLD, T2DM.  Pt remains full code; family wishes for [facility] to LTC placement. Per palliative care note tube feeding not appropriate for this pt.     Factors impacting intake: [ ] none [ ] nausea  [ ] vomiting [ ] diarrhea [ ] constipation  [ ]chewing problems [ ] swallowing issues  [X ] other: persistent lack of appetite    Diet Prescription: Diet, Pureed:   Moderately Thick Liquids (MODTHICKLIQS)  Supplement Feeding Modality:  Oral  Ensure Pudding Cans or Servings Per Day:  1       Frequency:  Two Times a day (10-06-22 @ 13:38)    Intake:   PO intake remains poor; pt consuming <50% of meals (most meals 25% c encouragement & total assistance)    Current Weight:   62.9 kg (10/08); admission wt 65.8 kg (10/3)  % Weight Change:   4.4% wt loss x 5 days    No edema noted    Pertinent Medications: MEDICATIONS  (STANDING):  donepezil 10 milliGRAM(s) Oral at bedtime  lactobacillus acidophilus 1 Tablet(s) Oral daily  memantine 10 milliGRAM(s) Oral two times a day  multivitamin 1 Tablet(s) Oral daily  pantoprazole  Injectable 40 milliGRAM(s) IV Push daily  simvastatin 20 milliGRAM(s) Oral at bedtime  tamsulosin 0.4 milliGRAM(s) Oral at bedtime    MEDICATIONS  (PRN):  melatonin 3 milliGRAM(s) Oral at bedtime PRN Insomnia    Pertinent Labs: 10-10 Na133 mmol/L<L> Glu 105 mg/dL<H> K+ 4.1 mmol/L Cr  1.33 mg/dL<H> BUN 11 mg/dL 10-04 Phos 4.3 mg/dL    Skin:   no pressure ulcers    Estimated Needs:   [X ] no change since previous assessment (10/06)  [ ] recalculated:     Previous Nutrition Diagnosis:   [X ]  Severe Malnutrition in context of chronic illness  Etiology:  Inadequate energy/protein intake related to advancing dementia  Signs & Symptoms:  physical findings of severe muscle wasting as noted on 10/6; pt consuming <75% nutrition needs >1 month    Previous GOAL:  Pt to consume >50% of estimated nutrition needs via tolerated route  NEW GOAL:  Provide nutrition/hydration as medically appropriate, as tolerated, & within family's wishes for tx    Nutrition Diagnosis is [X ] ongoing  [ ] resolved [ ] not applicable     New Nutrition Diagnosis: [x ] not applicable      Interventions:   continue current diet rx as noted  Recommend  [ ] Change Diet To:  [ ] Nutrition Supplement  [ ] Nutrition Support  [ ] Other:     Monitoring and Evaluation:   [x ] PO intake [ x ] Tolerance to diet prescription [ x ] weights [ x ] labs[ x ] follow up per protocol  [ ] other:

## 2022-10-10 NOTE — PROGRESS NOTE ADULT - ASSESSMENT
86 y/o M pmhx HTN, HLD, DM, alzheimer's dementia, recent COVID [facility], found to have UTI and severe dehydration.     Problem/Plan - 1:  ·  Problem: Acute cystitis.   ·  Plan: UA + with leuk esterases and wbcs  completed  ceftriaxone   -urine culture is growing > 3 organism.  repeat via straight cath growing candida but but remains stable off abx and antifungals. Discussed with Dr. Leroy and no need to treat       Problem/Plan - 2:  ·  Problem: RADHA (acute kidney injury).   ·  Plan: Cr elevated to 2.68 from 1.15  Likely from decreased po intake and severe dehydration. trending down       Problem/Plan - 3:  ·  Problem: Functional Quadriplegia  ·  Plan: weight loss, decreased po intake and now with hypokalemia and hypernatremia   - speech and swallow done  - patient may have supervised feeds Puree with thickened liquids   - discussed patient's prognosis with family, they would like patient to be d/c to LTC when ready     Problem/Plan - 4:  ·  Problem: Hypernatremia  ·  Plan: from decreased po intake and volume contraction   - change fluids to D5 at 75 cc/hr   - following bmp     Problem/Plan - 5:  ·  Problem: Hypertension.   ·  Plan: hold amlodipine for now    Problem/Plan - 6:  ·  Problem: Alzheimer's dementia.   ·  Plan: c/w memantine and donepazil.    Problem/Plan - 7:  ·  Problem: Anemia  - baseline hgb 12.0  on admission the patient was volume contracted and hgb was 15.  His current hgb of 11 likely represents his normal baseline. Doubt acute blood loss    thrombocytopenia   - dc heparin and check hit   - cbc        Need for prophylactic measure.   ·  Plan: diet: pureed with thickened liquids   dvt ppx: heparin subq  dispo: stable for dc to [facility]. palliative consult done and discussed GOC with family. remains full code. understands that not eating is a progression of the dementia and will discuss option with family   ethics: full code, spoke with pts dtr and wife.   dc planning to [facility]/LTC

## 2022-10-10 NOTE — PROGRESS NOTE ADULT - SUBJECTIVE AND OBJECTIVE BOX
88 y/o M pmhx HTN, HLD, DM, alzheimer, dementia [facility] Spoke with wife (divine) and daughter. Patient is normally A&Ox0, minimally responsive and needs help with all his ADLS. Today, patient was not responsive at all, and has been eating less and less of his food for the past few days. Family was told that he has had 50-60% of his food for the past few days and that he has been continuously losing weight. Otherwise, there are no reported fevers, chills, nausea, vomiting episodes. ROS is otherwise unattainable from patient, does not respond to verbal commands, but does respond to painful stimuli (positive nail-bed test).  (03 Oct 2022 21:28)    SUBJECTIVE & OBJECTIVE: Pt seen and examined at bedside. eating mildly improved     PHYSICAL EXAM:    Vital Signs Last 24 Hrs        Vital Signs Last 24 Hrs  T(C): 36.6 (10 Oct 2022 05:03), Max: 36.9 (09 Oct 2022 11:20)  T(F): 97.9 (10 Oct 2022 05:03), Max: 98.4 (09 Oct 2022 11:20)  HR: 77 (10 Oct 2022 05:03) (77 - 85)  BP: 102/67 (10 Oct 2022 05:03) (99/66 - 116/71)  BP(mean): --  RR: 18 (10 Oct 2022 05:03) (18 - 18)  SpO2: 96% (10 Oct 2022 05:03) (95% - 100%)    Parameters below as of 10 Oct 2022 05:03  Patient On (Oxygen Delivery Method): room air          GENERAL: very thin and chronically ill appearing   HEAD:  Atraumatic, Normocephalic  EYES: EOMI, PERRLA, conjunctiva and sclera clear  ENMT: Moist mucous membranes, edentulous  NECK: Supple, No JVD  NERVOUS SYSTEM:  not following commands, Motor Strength 4/5 B/L upper and lower extremities; DTRs 2+ intact and symmetric  CHEST/LUNG: poor air entry bilaterally   HEART: Regular rate and rhythm; No murmurs, rubs, or gallops  ABDOMEN: Soft, Nontender, Nondistended; Bowel sounds present  EXTREMITIES:  2+ Peripheral Pulses, No clubbing, cyanosis, or edema  LABS:                          11.3   4.24  )-----------( 86       ( 09 Oct 2022 09:45 )             36.8     10-09    137  |  110<H>  |  15  ----------------------------<  106<H>  3.9   |  20<L>  |  1.24    Ca    8.2<L>      09 Oct 2022 09:45

## 2022-10-11 LAB
ANION GAP SERPL CALC-SCNC: 11 MMOL/L — SIGNIFICANT CHANGE UP (ref 5–17)
BUN SERPL-MCNC: 15 MG/DL — SIGNIFICANT CHANGE UP (ref 7–23)
CALCIUM SERPL-MCNC: 8.6 MG/DL — SIGNIFICANT CHANGE UP (ref 8.5–10.1)
CHLORIDE SERPL-SCNC: 108 MMOL/L — SIGNIFICANT CHANGE UP (ref 96–108)
CO2 SERPL-SCNC: 20 MMOL/L — LOW (ref 22–31)
CREAT SERPL-MCNC: 1.57 MG/DL — HIGH (ref 0.5–1.3)
EGFR: 43 ML/MIN/1.73M2 — LOW
GLUCOSE SERPL-MCNC: 102 MG/DL — HIGH (ref 70–99)
HCT VFR BLD CALC: 37.4 % — LOW (ref 39–50)
HGB BLD-MCNC: 11.7 G/DL — LOW (ref 13–17)
MCHC RBC-ENTMCNC: 25.3 PG — LOW (ref 27–34)
MCHC RBC-ENTMCNC: 31.3 G/DL — LOW (ref 32–36)
MCV RBC AUTO: 80.8 FL — SIGNIFICANT CHANGE UP (ref 80–100)
NRBC # BLD: 0 /100 WBCS — SIGNIFICANT CHANGE UP (ref 0–0)
PLATELET # BLD AUTO: 145 K/UL — LOW (ref 150–400)
POTASSIUM SERPL-MCNC: 3.9 MMOL/L — SIGNIFICANT CHANGE UP (ref 3.5–5.3)
POTASSIUM SERPL-SCNC: 3.9 MMOL/L — SIGNIFICANT CHANGE UP (ref 3.5–5.3)
RBC # BLD: 4.63 M/UL — SIGNIFICANT CHANGE UP (ref 4.2–5.8)
RBC # FLD: 15.9 % — HIGH (ref 10.3–14.5)
SODIUM SERPL-SCNC: 139 MMOL/L — SIGNIFICANT CHANGE UP (ref 135–145)
WBC # BLD: 3.64 K/UL — LOW (ref 3.8–10.5)
WBC # FLD AUTO: 3.64 K/UL — LOW (ref 3.8–10.5)

## 2022-10-11 PROCEDURE — 99232 SBSQ HOSP IP/OBS MODERATE 35: CPT

## 2022-10-11 RX ORDER — SODIUM CHLORIDE 9 MG/ML
1000 INJECTION, SOLUTION INTRAVENOUS
Refills: 0 | Status: DISCONTINUED | OUTPATIENT
Start: 2022-10-11 | End: 2022-10-19

## 2022-10-11 RX ADMIN — TAMSULOSIN HYDROCHLORIDE 0.4 MILLIGRAM(S): 0.4 CAPSULE ORAL at 21:54

## 2022-10-11 RX ADMIN — PANTOPRAZOLE SODIUM 40 MILLIGRAM(S): 20 TABLET, DELAYED RELEASE ORAL at 11:27

## 2022-10-11 RX ADMIN — SODIUM CHLORIDE 75 MILLILITER(S): 9 INJECTION, SOLUTION INTRAVENOUS at 14:58

## 2022-10-11 RX ADMIN — SIMVASTATIN 20 MILLIGRAM(S): 20 TABLET, FILM COATED ORAL at 21:54

## 2022-10-11 RX ADMIN — DONEPEZIL HYDROCHLORIDE 10 MILLIGRAM(S): 10 TABLET, FILM COATED ORAL at 21:54

## 2022-10-11 RX ADMIN — Medication 1 TABLET(S): at 11:27

## 2022-10-11 RX ADMIN — MEMANTINE HYDROCHLORIDE 10 MILLIGRAM(S): 10 TABLET ORAL at 06:05

## 2022-10-11 RX ADMIN — MEMANTINE HYDROCHLORIDE 10 MILLIGRAM(S): 10 TABLET ORAL at 17:35

## 2022-10-11 NOTE — PROGRESS NOTE ADULT - SUBJECTIVE AND OBJECTIVE BOX
86 y/o M pmhx HTN, HLD, DM, alzheimer, [facility] Spoke with wife (divine) and daughter. Patient is normally A&Ox0, minimally responsive and needs help with all his ADLS. Today, patient was not responsive at all, and has been eating less and less of his food for the past few days. Family was told that he has had 50-60% of his food for the past few days and that he has been continuously losing weight. Otherwise, there are no reported fevers, chills, nausea, vomiting episodes. ROS is otherwise unattainable from patient, does not respond to verbal commands, but does respond to painful stimuli (positive nail-bed test).  (03 Oct 2022 21:28)    SUBJECTIVE & OBJECTIVE: Pt seen and examined at bedside. still eating poor         PHYSICAL EXAM:      Vital Signs Last 24 Hrs  T(C): 36.9 (11 Oct 2022 11:14), Max: 37.2 (11 Oct 2022 05:14)  T(F): 98.4 (11 Oct 2022 11:14), Max: 98.9 (11 Oct 2022 05:14)  HR: 88 (11 Oct 2022 11:14) (82 - 95)  BP: 104/70 (11 Oct 2022 11:14) (104/70 - 119/73)  BP(mean): --  RR: 19 (11 Oct 2022 11:14) (17 - 19)  SpO2: 100% (11 Oct 2022 11:14) (95% - 100%)            GENERAL: very thin and chronically ill appearing   HEAD:  Atraumatic, Normocephalic  EYES: EOMI, PERRLA, conjunctiva and sclera clear  ENMT: Moist mucous membranes, edentulous  NECK: Supple, No JVD  NERVOUS SYSTEM:  not following commands, Motor Strength 4/5 B/L upper and lower extremities; DTRs 2+ intact and symmetric  CHEST/LUNG: poor air entry bilaterally   HEART: Regular rate and rhythm; No murmurs, rubs, or gallops  ABDOMEN: Soft, Nontender, Nondistended; Bowel sounds present  EXTREMITIES:  2+ Peripheral Pulses, No clubbing, cyanosis, or edema  LABS:                        11.7   3.64  )-----------( 145      ( 11 Oct 2022 05:47 )             37.4     10-11    139  |  108  |  15  ----------------------------<  102<H>  3.9   |  20<L>  |  1.57<H>    Ca    8.6      11 Oct 2022 05:47

## 2022-10-11 NOTE — PROGRESS NOTE ADULT - ASSESSMENT
86 y/o M pmhx HTN, HLD, DM, alzheimer's dementia, recent [facility], found to have UTI and severe dehydration.     Problem/Plan - 1:  ·  Problem: Acute cystitis.   ·  Plan: UA + with leuk esterases and wbcs  completed  ceftriaxone   -urine culture is growing > 3 organism.  repeat via straight cath growing candida but but remains stable off abx and antifungals. Discussed with Dr. Leroy and no need to treat       Problem/Plan - 2:  ·  Problem: RADHA (acute kidney injury).   ·  Plan: Cr elevated to 2.68 from 1.15 but now rising after I dc fluids. Likely from decreased po intake and severe dehydration. restart fluids     Problem/Plan - 3:  ·  Problem: Functional Quadriplegia  ·  Plan: weight loss, decreased po intake and now with hypokalemia and hypernatremia   - speech and swallow done  - patient may have supervised feeds Puree with thickened liquids   - discussed patient's prognosis with family, they would like patient to be d/c to LTC when ready     Problem/Plan - 4:  ·  Problem: Hypernatremia  ·  Plan: from decreased po intake and volume contraction   - change fluids to D5 at 75 cc/hr   - following bmp     Problem/Plan - 5:  ·  Problem: Hypertension.   ·  Plan: hold amlodipine for now    Problem/Plan - 6:  ·  Problem: Alzheimer's dementia.   ·  Plan: c/w memantine and donepazil.    Problem/Plan - 7:  ·  Problem: Anemia  - baseline hgb 12.0  on admission the patient was volume contracted and hgb was 15.  His current hgb of 11 likely represents his normal baseline. Doubt acute blood loss    thrombocytopenia   - dc heparin and trending up   - hit neg          Need for prophylactic measure.   ·  Plan: diet: pureed with thickened liquids   dvt ppx: heparin subq  dispo: stable for dc to [facility]. palliative consult done and discussed GOC with family. remains full code. understands that not eating is a progression of the dementia and will discuss option with family   ethics: remains full cod and reinforced that pt is not eating and will likely be sent back to the hospital unless he has a peg if they dont want to go the hospice / comfort care route. will readdress with family     dc planning to /LTC

## 2022-10-12 LAB
ANION GAP SERPL CALC-SCNC: 8 MMOL/L — SIGNIFICANT CHANGE UP (ref 5–17)
BUN SERPL-MCNC: 15 MG/DL — SIGNIFICANT CHANGE UP (ref 7–23)
CALCIUM SERPL-MCNC: 8.1 MG/DL — LOW (ref 8.5–10.1)
CHLORIDE SERPL-SCNC: 109 MMOL/L — HIGH (ref 96–108)
CO2 SERPL-SCNC: 22 MMOL/L — SIGNIFICANT CHANGE UP (ref 22–31)
CREAT SERPL-MCNC: 1.48 MG/DL — HIGH (ref 0.5–1.3)
EGFR: 46 ML/MIN/1.73M2 — LOW
GLUCOSE SERPL-MCNC: 112 MG/DL — HIGH (ref 70–99)
POTASSIUM SERPL-MCNC: 3.7 MMOL/L — SIGNIFICANT CHANGE UP (ref 3.5–5.3)
POTASSIUM SERPL-SCNC: 3.7 MMOL/L — SIGNIFICANT CHANGE UP (ref 3.5–5.3)
SODIUM SERPL-SCNC: 139 MMOL/L — SIGNIFICANT CHANGE UP (ref 135–145)

## 2022-10-12 PROCEDURE — 99232 SBSQ HOSP IP/OBS MODERATE 35: CPT

## 2022-10-12 RX ADMIN — Medication 1 TABLET(S): at 11:10

## 2022-10-12 RX ADMIN — MEMANTINE HYDROCHLORIDE 10 MILLIGRAM(S): 10 TABLET ORAL at 17:05

## 2022-10-12 RX ADMIN — SIMVASTATIN 20 MILLIGRAM(S): 20 TABLET, FILM COATED ORAL at 22:21

## 2022-10-12 RX ADMIN — SODIUM CHLORIDE 75 MILLILITER(S): 9 INJECTION, SOLUTION INTRAVENOUS at 22:20

## 2022-10-12 RX ADMIN — MEMANTINE HYDROCHLORIDE 10 MILLIGRAM(S): 10 TABLET ORAL at 06:47

## 2022-10-12 RX ADMIN — DONEPEZIL HYDROCHLORIDE 10 MILLIGRAM(S): 10 TABLET, FILM COATED ORAL at 22:21

## 2022-10-12 RX ADMIN — TAMSULOSIN HYDROCHLORIDE 0.4 MILLIGRAM(S): 0.4 CAPSULE ORAL at 22:21

## 2022-10-12 RX ADMIN — SODIUM CHLORIDE 75 MILLILITER(S): 9 INJECTION, SOLUTION INTRAVENOUS at 06:47

## 2022-10-12 RX ADMIN — Medication 1 TABLET(S): at 11:09

## 2022-10-12 RX ADMIN — PANTOPRAZOLE SODIUM 40 MILLIGRAM(S): 20 TABLET, DELAYED RELEASE ORAL at 11:12

## 2022-10-12 NOTE — PROGRESS NOTE ADULT - SUBJECTIVE AND OBJECTIVE BOX
86 y/o M pmhx HTN, HLD, DM, alzheimer, dementia [facility] Spoke with wife (divine) and daughter. Patient is normally A&Ox0, minimally responsive and needs help with all his ADLS. Today, patient was not responsive at all, and has been eating less and less of his food for the past few days. Family was told that he has had 50-60% of his food for the past few days and that he has been continuously losing weight. Otherwise, there are no reported fevers, chills, nausea, vomiting episodes. ROS is otherwise unattainable from patient, does not respond to verbal commands, but does respond to painful stimuli (positive nail-bed test).  (03 Oct 2022 21:28)    SUBJECTIVE & OBJECTIVE: Pt seen and examined at bedside.     MEDICATIONS  (STANDING):  dextrose 5%. 1000 milliLiter(s) (75 mL/Hr) IV Continuous <Continuous>  donepezil 10 milliGRAM(s) Oral at bedtime  lactobacillus acidophilus 1 Tablet(s) Oral daily  memantine 10 milliGRAM(s) Oral two times a day  multivitamin 1 Tablet(s) Oral daily  pantoprazole  Injectable 40 milliGRAM(s) IV Push daily  simvastatin 20 milliGRAM(s) Oral at bedtime  tamsulosin 0.4 milliGRAM(s) Oral at bedtime    MEDICATIONS  (PRN):  melatonin 3 milliGRAM(s) Oral at bedtime PRN Insomnia      Vital Signs Last 24 Hrs  T(C): 36.9 (12 Oct 2022 06:02), Max: 37.2 (11 Oct 2022 23:46)  T(F): 98.4 (12 Oct 2022 06:02), Max: 98.9 (11 Oct 2022 23:46)  HR: 74 (12 Oct 2022 06:02) (74 - 97)  BP: 116/68 (12 Oct 2022 06:02) (96/58 - 116/68)  BP(mean): --  RR: 18 (12 Oct 2022 06:02) (18 - 19)  SpO2: 99% (12 Oct 2022 06:02) (96% - 100%)      GENERAL: very thin and chronically ill appearing   HEAD:  Atraumatic, Normocephalic  EYES: EOMI, PERRLA, conjunctiva and sclera clear  ENMT: Moist mucous membranes, edentulous  NECK: Supple,   NERVOUS SYSTEM:  not following commands, Motor Strength 4/5 B/L upper and lower extremities; DTRs 2+ intact and symmetric  CHEST/LUNG: poor air entry bilaterally   HEART: Regular rate and rhythm; No murmurs, rubs, or gallops  ABDOMEN: Soft, Nontender, Nondistended; Bowel sounds present  EXTREMITIES:  2+ Peripheral Pulses, No clubbing, cyanosis, or edema  LABS:                                      11.7   3.64  )-----------( 145      ( 11 Oct 2022 05:47 )             37.4       10-12    139  |  109<H>  |  15  ----------------------------<  112<H>  3.7   |  22  |  1.48<H>    Ca    8.1<L>      12 Oct 2022 06:55             88 y/o M pmhx HTN, HLD, DM, alzheimer, dementia [facility] Spoke with wife (divine) and daughter. Patient is normally A&Ox0, minimally responsive and needs help with all his ADLS. Today, patient was not responsive at all, and has been eating less and less of his food for the past few days. Family was told that he has had 50-60% of his food for the past few days and that he has been continuously losing weight. Otherwise, there are no reported fevers, chills, nausea, vomiting episodes. ROS is otherwise unattainable from patient, does not respond to verbal commands, but does respond to painful stimuli (positive nail-bed test).  (03 Oct 2022 21:28)    SUBJECTIVE & OBJECTIVE: Pt seen and examined at bedside.     MEDICATIONS  (STANDING):  dextrose 5%. 1000 milliLiter(s) (75 mL/Hr) IV Continuous <Continuous>  donepezil 10 milliGRAM(s) Oral at bedtime  lactobacillus acidophilus 1 Tablet(s) Oral daily  memantine 10 milliGRAM(s) Oral two times a day  multivitamin 1 Tablet(s) Oral daily  pantoprazole  Injectable 40 milliGRAM(s) IV Push daily  simvastatin 20 milliGRAM(s) Oral at bedtime  tamsulosin 0.4 milliGRAM(s) Oral at bedtime    MEDICATIONS  (PRN):  melatonin 3 milliGRAM(s) Oral at bedtime PRN Insomnia      Vital Signs Last 24 Hrs  T(C): 36.9 (12 Oct 2022 06:02), Max: 37.2 (11 Oct 2022 23:46)  T(F): 98.4 (12 Oct 2022 06:02), Max: 98.9 (11 Oct 2022 23:46)  HR: 74 (12 Oct 2022 06:02) (74 - 97)  BP: 116/68 (12 Oct 2022 06:02) (96/58 - 116/68)  BP(mean): --  RR: 18 (12 Oct 2022 06:02) (18 - 19)  SpO2: 99% (12 Oct 2022 06:02) (96% - 100%)      GENERAL: very thin and chronically ill appearing   HEAD:  Atraumatic, Normocephalic  EYES: EOMI, PERRLA, conjunctiva and sclera clear  ENMT: Moist mucous membranes, edentulous  NECK: Supple,   NERVOUS SYSTEM: alert and confused   not following commands, moaning but not in painful way .   CHEST/LUNG: poor air entry bilaterally   HEART: Regular rate and rhythm; No murmurs, rubs, or gallops  ABDOMEN: Soft, Nontender, Nondistended; Bowel sounds present  EXTREMITIES:  2+ Peripheral Pulses, No clubbing, cyanosis, or edema  LABS:                                      11.7   3.64  )-----------( 145      ( 11 Oct 2022 05:47 )             37.4       10-12    139  |  109<H>  |  15  ----------------------------<  112<H>  3.7   |  22  |  1.48<H>    Ca    8.1<L>      12 Oct 2022 06:55

## 2022-10-12 NOTE — PROGRESS NOTE ADULT - ASSESSMENT
88 y/o M pmhx HTN, HLD, DM, alzheimer's dementia, recent [facility], found to have UTI and severe dehydration.     Problem/Plan - 1:  ·  Problem: Acute cystitis.   ·  Plan: UA + with leuk esterases and wbcs  completed  ceftriaxone   -urine culture is growing > 3 organism.  repeat via straight cath growing candida but but remains stable off abx and antifungals. per my colleague's documenatation he discussed with Dr. Leroy and no need to treat       Problem/Plan - 2:  ·  Problem: RADHA (acute kidney injury).    presumed prerenal and  dehydration showing improvements with IVF that my colleague restarted     Problem/Plan - 3:  ·  Problem: Functional Quadriplegia  ·  Plan: weight loss, decreased po intake   - speech and swallow done  - patient may have supervised feeds Puree with thickened liquids    my colleague informed me he d/w family regarding peg and they did not make any decisions  - my colleague discussed patient's prognosis with family, they would like patient to be d/c to LTC when ready     Problem/Plan - 4:  ·  Problem: Hypernatremia improved       Problem/Plan - 5:  ·  Problem: Hypertension.   ·  Plan: hold amlodipine for now  bp is stable    Problem/Plan - 6:  ·  Problem: Alzheimer's dementia.   ·  Plan: c/w memantine and Donepezil    Problem/Plan - 7:  ·  Problem: Anemia  -per documentarian by my colleague baseline hgb 12.0  on admission the patient was volume contracted and hgb was 15.  His current hgb of 11 likely represents his normal baseline. Doubt acute blood loss    thrombocytopenia improving   - dc heparin and trending up   - hit neg          Need for prophylactic measure.   ·  Plan: diet: pureed with thickened liquids   dvt ppx: heparin subq  dispo: stable for dc to [facility]. palliative consult done and discussed GOC with family. remains full code. understands that not eating is a progression of the dementia and will discuss option with family   ethics: remains full cod and reinforced that pt is not eating and will likely be sent back to the hospital unless he has a peg if they dont want to go the hospice / comfort care route. will readdress with family     dc planning to /LTC

## 2022-10-12 NOTE — CHART NOTE - NSCHARTNOTEFT_GEN_A_CORE
Had discussion with family who is aware of patient's situation, did not wish to make any decisions at this time.  Anticipatory guidance provided.  Will sign off at this time.  Please reconsult should GOC change or symptoms arise.

## 2022-10-13 LAB
ANION GAP SERPL CALC-SCNC: 8 MMOL/L — SIGNIFICANT CHANGE UP (ref 5–17)
BUN SERPL-MCNC: 9 MG/DL — SIGNIFICANT CHANGE UP (ref 7–23)
CALCIUM SERPL-MCNC: 8.4 MG/DL — LOW (ref 8.5–10.1)
CHLORIDE SERPL-SCNC: 107 MMOL/L — SIGNIFICANT CHANGE UP (ref 96–108)
CO2 SERPL-SCNC: 20 MMOL/L — LOW (ref 22–31)
CREAT SERPL-MCNC: 1.41 MG/DL — HIGH (ref 0.5–1.3)
EGFR: 49 ML/MIN/1.73M2 — LOW
GLUCOSE SERPL-MCNC: 172 MG/DL — HIGH (ref 70–99)
HCT VFR BLD CALC: 35.3 % — LOW (ref 39–50)
HGB BLD-MCNC: 11.1 G/DL — LOW (ref 13–17)
MAGNESIUM SERPL-MCNC: 2.1 MG/DL — SIGNIFICANT CHANGE UP (ref 1.6–2.6)
MCHC RBC-ENTMCNC: 25.8 PG — LOW (ref 27–34)
MCHC RBC-ENTMCNC: 31.4 G/DL — LOW (ref 32–36)
MCV RBC AUTO: 82.1 FL — SIGNIFICANT CHANGE UP (ref 80–100)
NRBC # BLD: 0 /100 WBCS — SIGNIFICANT CHANGE UP (ref 0–0)
PHOSPHATE SERPL-MCNC: 2 MG/DL — LOW (ref 2.5–4.5)
PLATELET # BLD AUTO: 143 K/UL — LOW (ref 150–400)
POTASSIUM SERPL-MCNC: 3.7 MMOL/L — SIGNIFICANT CHANGE UP (ref 3.5–5.3)
POTASSIUM SERPL-SCNC: 3.7 MMOL/L — SIGNIFICANT CHANGE UP (ref 3.5–5.3)
RBC # BLD: 4.3 M/UL — SIGNIFICANT CHANGE UP (ref 4.2–5.8)
RBC # FLD: 16 % — HIGH (ref 10.3–14.5)
SODIUM SERPL-SCNC: 135 MMOL/L — SIGNIFICANT CHANGE UP (ref 135–145)
WBC # BLD: 7.24 K/UL — SIGNIFICANT CHANGE UP (ref 3.8–10.5)
WBC # FLD AUTO: 7.24 K/UL — SIGNIFICANT CHANGE UP (ref 3.8–10.5)

## 2022-10-13 PROCEDURE — 99232 SBSQ HOSP IP/OBS MODERATE 35: CPT

## 2022-10-13 RX ADMIN — DONEPEZIL HYDROCHLORIDE 10 MILLIGRAM(S): 10 TABLET, FILM COATED ORAL at 21:26

## 2022-10-13 RX ADMIN — MEMANTINE HYDROCHLORIDE 10 MILLIGRAM(S): 10 TABLET ORAL at 18:50

## 2022-10-13 RX ADMIN — PANTOPRAZOLE SODIUM 40 MILLIGRAM(S): 20 TABLET, DELAYED RELEASE ORAL at 12:56

## 2022-10-13 RX ADMIN — SIMVASTATIN 20 MILLIGRAM(S): 20 TABLET, FILM COATED ORAL at 21:26

## 2022-10-13 RX ADMIN — Medication 1 TABLET(S): at 12:56

## 2022-10-13 RX ADMIN — SODIUM CHLORIDE 75 MILLILITER(S): 9 INJECTION, SOLUTION INTRAVENOUS at 12:57

## 2022-10-13 RX ADMIN — MEMANTINE HYDROCHLORIDE 10 MILLIGRAM(S): 10 TABLET ORAL at 05:55

## 2022-10-13 RX ADMIN — TAMSULOSIN HYDROCHLORIDE 0.4 MILLIGRAM(S): 0.4 CAPSULE ORAL at 21:26

## 2022-10-13 NOTE — PROGRESS NOTE ADULT - SUBJECTIVE AND OBJECTIVE BOX
86 y/o M pmhx HTN, HLD, DM, alzheimer, dementia [facility] Spoke with wife (divine) and daughter. Patient is normally A&Ox0, minimally responsive and needs help with all his ADLS. Today, patient was not responsive at all, and has been eating less and less of his food for the past few days. Family was told that he has had 50-60% of his food for the past few days and that he has been continuously losing weight. Otherwise, there are no reported fevers, chills, nausea, vomiting episodes. ROS is otherwise unattainable from patient, does not respond to verbal commands, but does respond to painful stimuli (positive nail-bed test).  (03 Oct 2022 21:28)    SUBJECTIVE & OBJECTIVE: Pt seen and examined at bedside.     MEDICATIONS  (STANDING):  dextrose 5%. 1000 milliLiter(s) (75 mL/Hr) IV Continuous <Continuous>  donepezil 10 milliGRAM(s) Oral at bedtime  lactobacillus acidophilus 1 Tablet(s) Oral daily  memantine 10 milliGRAM(s) Oral two times a day  multivitamin 1 Tablet(s) Oral daily  pantoprazole  Injectable 40 milliGRAM(s) IV Push daily  simvastatin 20 milliGRAM(s) Oral at bedtime  tamsulosin 0.4 milliGRAM(s) Oral at bedtime    MEDICATIONS  (PRN):  melatonin 3 milliGRAM(s) Oral at bedtime PRN Insomnia    Vital Signs Last 24 Hrs  T(C): 36.4 (13 Oct 2022 05:29), Max: 36.5 (12 Oct 2022 11:09)  T(F): 97.5 (13 Oct 2022 05:29), Max: 97.7 (12 Oct 2022 11:09)  HR: 75 (13 Oct 2022 05:29) (68 - 78)  BP: 120/72 (13 Oct 2022 05:29) (103/67 - 131/76)  BP(mean): --  RR: 18 (13 Oct 2022 05:29) (18 - 19)  SpO2: 97% (13 Oct 2022 05:29) (95% - 98%)          GENERAL: very thin and chronically ill appearing   HEAD:  Atraumatic, Normocephalic  EYES: EOMI, PERRLA, conjunctiva and sclera clear  ENMT: Moist mucous membranes, edentulous  NECK: Supple,   NERVOUS SYSTEM: alert and confused   not following commands, moaning but not in painful way .   CHEST/LUNG: poor air entry bilaterally   HEART: Regular rate and rhythm; No murmurs, rubs, or gallops  ABDOMEN: Soft, Nontender, Nondistended; Bowel sounds present  EXTREMITIES:  2+ Peripheral Pulses, No clubbing, cyanosis, or edema      LABS:

## 2022-10-13 NOTE — PROGRESS NOTE ADULT - ASSESSMENT
88 y/o M pmhx HTN, HLD, DM, alzheimer's dementia, recent [facility], found to have UTI and severe dehydration.     Problem/Plan - 1:  ·  Problem: Acute cystitis.   ·  Plan: UA + with leuk esterases and wbcs  completed  ceftriaxone   -urine culture is growing > 3 organism.  repeat via straight cath growing candida but but remains stable off abx and antifungals. per my colleague's documenatation he discussed with Dr. Leroy and no need to treat       Problem/Plan - 2:  ·  Problem: RADHA (acute kidney injury).    presumed prerenal and  dehydration showing improvements with IVF that my colleague restarted     Problem/Plan - 3:  ·  Problem: Functional Quadriplegia  ·  Plan: weight loss, decreased po intake   - speech and swallow done  - patient may have supervised feeds Puree with thickened liquids    my colleague informed me he d/w family regarding peg and they did not make any decisions  - my colleague discussed patient's prognosis with family, they would like patient to be d/c to LTC when ready     Problem/Plan - 4:  ·  Problem: Hypernatremia improved       Problem/Plan - 5:  ·  Problem: Hypertension.   ·  Plan: hold amlodipine for now  bp is stable    Problem/Plan - 6:  ·  Problem: Alzheimer's dementia.   ·  Plan: c/w memantine and Donepezil    Problem/Plan - 7:  ·  Problem: Anemia  -per documentarian by my colleague baseline hgb 12.0  on admission the patient was volume contracted and hgb was 15.  His current hgb of 11 likely represents his normal baseline. Doubt acute blood loss    thrombocytopenia improving   - dc heparin and trending up   - hit neg          Need for prophylactic measure.   ·  Plan: diet: pureed with thickened liquids   dvt ppx: heparin subq  dispo: stable for dc to [facility]. palliative consult done and discussed GOC with family. remains full code. understands that not eating is a progression of the dementia and will discuss option with family   ethics: remains full cod and reinforced that pt is not eating and will likely be sent back to the hospital unless he has a peg if they dont want to go the hospice / comfort care route. will readdress with family   10/13/2022 see palliative note dated 10/12/2022 remains full code  dc planning to /LTC

## 2022-10-14 LAB
FLUAV AG NPH QL: SIGNIFICANT CHANGE UP
FLUBV AG NPH QL: SIGNIFICANT CHANGE UP
SARS-COV-2 RNA SPEC QL NAA+PROBE: SIGNIFICANT CHANGE UP

## 2022-10-14 PROCEDURE — 99232 SBSQ HOSP IP/OBS MODERATE 35: CPT

## 2022-10-14 RX ADMIN — Medication 1 TABLET(S): at 13:54

## 2022-10-14 RX ADMIN — SODIUM CHLORIDE 75 MILLILITER(S): 9 INJECTION, SOLUTION INTRAVENOUS at 05:27

## 2022-10-14 RX ADMIN — PANTOPRAZOLE SODIUM 40 MILLIGRAM(S): 20 TABLET, DELAYED RELEASE ORAL at 13:54

## 2022-10-14 RX ADMIN — TAMSULOSIN HYDROCHLORIDE 0.4 MILLIGRAM(S): 0.4 CAPSULE ORAL at 22:31

## 2022-10-14 RX ADMIN — MEMANTINE HYDROCHLORIDE 10 MILLIGRAM(S): 10 TABLET ORAL at 18:27

## 2022-10-14 RX ADMIN — MEMANTINE HYDROCHLORIDE 10 MILLIGRAM(S): 10 TABLET ORAL at 05:25

## 2022-10-14 RX ADMIN — SODIUM CHLORIDE 75 MILLILITER(S): 9 INJECTION, SOLUTION INTRAVENOUS at 22:33

## 2022-10-14 RX ADMIN — SIMVASTATIN 20 MILLIGRAM(S): 20 TABLET, FILM COATED ORAL at 22:31

## 2022-10-14 RX ADMIN — DONEPEZIL HYDROCHLORIDE 10 MILLIGRAM(S): 10 TABLET, FILM COATED ORAL at 22:31

## 2022-10-14 RX ADMIN — Medication 3 MILLIGRAM(S): at 22:32

## 2022-10-14 NOTE — CHART NOTE - NSCHARTNOTEFT_GEN_A_CORE
Pt admitted from [facility] for worsening responsiveness, decreased PO intake, continued wt loss; found c UTI, severe dehydration, RADHA, hyponatremia, hypokalemia, anemia. Pt is dependent for all ADLs; recent COVID illness.  PMHx includes dementia, HTN, HLD, T2DM.  Pt remains full code; family wishes for [facility] to LTC placement. Per palliative care note tube feeding not appropriate for this pt.       Factors impacting intake: [ ] none [ ] nausea  [ ] vomiting [ ] diarrhea [ ] constipation  [ ]chewing problems [ ] swallowing issues  [ X] other: persistent lack of appetite    Diet Prescription: Diet, Pureed:   Moderately Thick Liquids (MODTHICKLIQS)  Supplement Feeding Modality:  Oral  Ensure Pudding Cans or Servings Per Day:  1       Frequency:  Two Times a day (10-06-22 @ 13:38)    Intake:   PO intake remains poor; pt consuming <50% of meals (most meals 25% c encouragement & total assistance); CNA reports pt eats best at breakfast    Current Weight:   62.5 kg (10/13); admission wt 65.8 kg (10/3)  % Weight Change:   5% wt loss x 10 days    No edema noted    Nutrition focused physical exam conducted:    Subcutaneous fat loss: [moderate ] Orbital fat pads region, [unable ]Buccal fat region, [severe ]Triceps region,  [severe ]Ribs region.    Muscle wasting: [severe ]Temples region, [severe ]Clavicle region, [severe ]Shoulder region, [unable ]Scapula region, [ unable]Interosseous region,  [ severe]thigh region, [severe ]Calf region    Pertinent Medications: MEDICATIONS  (STANDING):  dextrose 5%. 1000 milliLiter(s) (75 mL/Hr) IV Continuous <Continuous>  donepezil 10 milliGRAM(s) Oral at bedtime  lactobacillus acidophilus 1 Tablet(s) Oral daily  memantine 10 milliGRAM(s) Oral two times a day  multivitamin 1 Tablet(s) Oral daily  pantoprazole  Injectable 40 milliGRAM(s) IV Push daily  simvastatin 20 milliGRAM(s) Oral at bedtime  tamsulosin 0.4 milliGRAM(s) Oral at bedtime    MEDICATIONS  (PRN):  melatonin 3 milliGRAM(s) Oral at bedtime PRN Insomnia    Pertinent Labs: 10-13 Na135 mmol/L Glu 172 mg/dL<H> K+ 3.7 mmol/L Cr  1.41 mg/dL<H> BUN 9 mg/dL 10-13 Phos 2.0 mg/dL<L>    Skin:  no pressure ulcers noted      Estimated Needs:   [X ] no change since previous assessment  (10/06)  [ ] recalculated:     Previous Nutrition Diagnosis:   [X ]  Severe Malnutrition in context of chronic illness  Etiology:  Inadequate energy/protein intake related to advancing dementia  Signs & Symptoms:  physical findings of severe muscle wasting as noted; pt consuming <75% nutrition needs >1 month; 5% wt loss x 10 days    GOAL:  Provide nutrition/hydration as medically appropriate, as tolerated, & within family's wishes for tx    Nutrition Diagnosis is [X ] ongoing  [ ] resolved [ ] not applicable     New Nutrition Diagnosis: [x ] not applicable    Interventions:   continue current diet rx as noted  Recommend  [ ] Change Diet To:  [ ] Nutrition Supplement  [ ] Nutrition Support  [ ] Other:     Monitoring and Evaluation:   [x ] PO intake [ x ] Tolerance to diet prescription [ x ] weights [ x ] labs[ x ] follow up per protocol  [ ] other:

## 2022-10-14 NOTE — PROGRESS NOTE ADULT - SUBJECTIVE AND OBJECTIVE BOX
Patient is a 87y old  Male who presents with a chief complaint of decrease po intake (13 Oct 2022 09:11)    HPI:  88 y/o M pmhx HTN, HLD, DM, alzheimer, dementia sent from [facility] for worsening responsiveness. Spoke with wife (divine) and daughter. Patient is normally A&Ox0, minimally responsive and needs help with all his ADLS. Today, patient was not responsive at all, and has been eating less and less of his food for the past few days. Family was told that he has had 50-60% of his food for the past few days and that he has been continuously losing weight. Otherwise, there are no reported fevers, chills, nausea, vomiting episodes. ROS is otherwise unattainable from patient, does not respond to verbal commands, but does respond to painful stimuli (positive nail-bed test).  (03 Oct 2022 21:28)    SUBJECTIVE & OBJECTIVE: Pt seen and examined at bedside.   PHYSICAL EXAM:  T(C): 36.9 (10-15-22 @ 05:59), Max: 36.9 (10-15-22 @ 05:59)  HR: 85 (10-15-22 @ 05:59) (85 - 94)  BP: 107/71 (10-15-22 @ 05:59) (107/71 - 125/76)  RR: 18 (10-15-22 @ 05:59) (18 - 19)  SpO2: 96% (10-15-22 @ 05:59) (96% - 99%) Daily     Daily I&O's Detail    14 Oct 2022 07:01  -  15 Oct 2022 07:00  --------------------------------------------------------  IN:    Oral Fluid: 300 mL  Total IN: 300 mL    OUT:  Total OUT: 0 mL    Total NET: 300 mL        GENERAL: thin and chronically ill appearing   HEAD:  Atraumatic, Normocephalic  EYES: EOMI, PERRLA, conjunctiva and sclera clear  ENMT: Moist mucous membranes  NECK: Supple, No JVD  NERVOUS SYSTEM:  non-verbal and   CHEST/LUNG: Clear to auscultation bilaterally; No rales, rhonchi, wheezing, or rubs  HEART: Regular rate and rhythm; No murmurs, rubs, or gallops  ABDOMEN: Soft, Nontender, Nondistended; Bowel sounds present  EXTREMITIES:  2+ Peripheral Pulses, No clubbing, cyanosis, or edema  LABS:                        11.1   7.24  )-----------( 143      ( 13 Oct 2022 10:00 )             35.3   CAPILLARY BLOOD GLUCOSE        RECENT CULTURES:   RADIOLOGY & ADDITIONAL TESTS:   Patient is a 87y old  Male who presents with a chief complaint of decrease po intake (13 Oct 2022 09:11)    HPI:  88 y/o M pmhx HTN, HLD, DM, alzheimer, dementia sent from [facility] for worsening responsiveness. Spoke with wife (divine) and daughter. Patient is normally A&Ox0, minimally responsive and needs help with all his ADLS. Today, patient was not responsive at all, and has been eating less and less of his food for the past few days. Family was told that he has had 50-60% of his food for the past few days and that he has been continuously losing weight. Otherwise, there are no reported fevers, chills, nausea, vomiting episodes. ROS is otherwise unattainable from patient, does not respond to verbal commands, but does respond to painful stimuli (positive nail-bed test).  (03 Oct 2022 21:28)    SUBJECTIVE & OBJECTIVE: Pt seen and examined at bedside.   PHYSICAL EXAM:  T(C): 36.9 (10-15-22 @ 05:59), Max: 36.9 (10-15-22 @ 05:59)  HR: 85 (10-15-22 @ 05:59) (85 - 94)  BP: 107/71 (10-15-22 @ 05:59) (107/71 - 125/76)  RR: 18 (10-15-22 @ 05:59) (18 - 19)  SpO2: 96% (10-15-22 @ 05:59) (96% - 99%) Daily     Daily I&O's Detail    14 Oct 2022 07:01  -  15 Oct 2022 07:00  --------------------------------------------------------  IN:    Oral Fluid: 300 mL  Total IN: 300 mL    OUT:  Total OUT: 0 mL    Total NET: 300 mL        GENERAL: thin and chronically ill appearing   HEAD:  Atraumatic, Normocephalic  EYES: EOMI, PERRLA, conjunctiva and sclera clear  ENMT: Moist mucous membranes  NECK: Supple, No JVD  NERVOUS SYSTEM:  non-verbal and not following commands  CHEST/LUNG: poor air entry bilaterally   HEART: Regular rate and rhythm; No murmurs, rubs, or gallops  ABDOMEN: Soft, Nontender, Nondistended; Bowel sounds present  EXTREMITIES:  2+ Peripheral Pulses, No clubbing, cyanosis, or edema  LABS:                        11.1   7.24  )-----------( 143      ( 13 Oct 2022 10:00 )             35.3   CAPILLARY BLOOD GLUCOSE        RECENT CULTURES:   RADIOLOGY & ADDITIONAL TESTS:

## 2022-10-14 NOTE — PROGRESS NOTE ADULT - ASSESSMENT
86 y/o M pmhx HTN, HLD, DM, alzheimer's dementia, recent COVID sent [facility] for [facility], found to have UTI and severe dehydration.     Problem/Plan - 1:  ·  Problem: Acute cystitis.   ·  Plan: UA + with leuk esterases and wbcs  completed  ceftriaxone   -urine culture is growing > 3 organism.  repeat via straight cath growing candida but but remains stable off abx and antifungals. per my colleague's documenatation he discussed with Dr. Leroy and no need to treat       Problem/Plan - 2:  ·  Problem: RADHA (acute kidney injury).    presumed prerenal and  dehydration showing improvements with IVF that my colleague restarted     Problem/Plan - 3:  ·  Problem: Functional Quadriplegia  ·  Plan: weight loss, decreased po intake   - speech and swallow done  - patient may have supervised feeds Puree with thickened liquids    my colleague informed me he d/w family regarding peg and they did not make any decisions  - my colleague discussed patient's prognosis with family, they would like patient to be d/c to LTC when ready     Problem/Plan - 4:  ·  Problem: Hypernatremia improved       Problem/Plan - 5:  ·  Problem: Hypertension.   ·  Plan: hold amlodipine for now  bp is stable    Problem/Plan - 6:  ·  Problem: Alzheimer's dementia.   ·  Plan: c/w memantine and Donepezil    Problem/Plan - 7:  ·  Problem: Anemia  -per documentarian by my colleague baseline hgb 12.0  on admission the patient was volume contracted and hgb was 15.  His current hgb of 11 likely represents his normal baseline. Doubt acute blood loss    thrombocytopenia improving   - dc heparin and trending up   - hit neg          Need for prophylactic measure.   ·  Plan: diet: pureed with thickened liquids   dvt ppx: heparin subq  dispo: stable for dc to [facility]. palliative consult done and discussed GOC with family. remains full code. understands

## 2022-10-15 PROCEDURE — 99232 SBSQ HOSP IP/OBS MODERATE 35: CPT

## 2022-10-15 RX ADMIN — DONEPEZIL HYDROCHLORIDE 10 MILLIGRAM(S): 10 TABLET, FILM COATED ORAL at 22:33

## 2022-10-15 RX ADMIN — SODIUM CHLORIDE 75 MILLILITER(S): 9 INJECTION, SOLUTION INTRAVENOUS at 18:17

## 2022-10-15 RX ADMIN — MEMANTINE HYDROCHLORIDE 10 MILLIGRAM(S): 10 TABLET ORAL at 05:31

## 2022-10-15 RX ADMIN — SIMVASTATIN 20 MILLIGRAM(S): 20 TABLET, FILM COATED ORAL at 22:36

## 2022-10-15 RX ADMIN — TAMSULOSIN HYDROCHLORIDE 0.4 MILLIGRAM(S): 0.4 CAPSULE ORAL at 22:33

## 2022-10-15 RX ADMIN — MEMANTINE HYDROCHLORIDE 10 MILLIGRAM(S): 10 TABLET ORAL at 17:26

## 2022-10-15 RX ADMIN — SODIUM CHLORIDE 75 MILLILITER(S): 9 INJECTION, SOLUTION INTRAVENOUS at 05:30

## 2022-10-15 RX ADMIN — Medication 1 TABLET(S): at 11:44

## 2022-10-15 RX ADMIN — PANTOPRAZOLE SODIUM 40 MILLIGRAM(S): 20 TABLET, DELAYED RELEASE ORAL at 11:44

## 2022-10-15 NOTE — PROGRESS NOTE ADULT - ASSESSMENT
86 y/o M pmhx HTN, HLD, DM, alzheimer's dementia, recent COVID [facility], found to have UTI and severe dehydration.       d/c on monday when bed available at LTC center      Problem/Plan - 1:  ·  Problem: Acute cystitis.   ·  Plan: UA + with leuk esterases and wbcs  completed  ceftriaxone   -urine culture is growing > 3 organism.  repeat via straight cath growing candida but but remains stable off abx and antifungals. per my colleague's documenatation he discussed with Dr. Leroy and no need to treat       Problem/Plan - 2:  ·  Problem: RADHA (acute kidney injury).    presumed prerenal and  dehydration showing improvements with IVF that my colleague restarted     Problem/Plan - 3:  ·  Problem: Functional Quadriplegia  ·  Plan: weight loss, decreased po intake   - speech and swallow done  - patient may have supervised feeds Puree with thickened liquids    my colleague informed me he d/w family regarding peg and they did not make any decisions  - my colleague discussed patient's prognosis with family, they would like patient to be d/c to LTC when ready     Problem/Plan - 4:  ·  Problem: Hypernatremia improved       Problem/Plan - 5:  ·  Problem: Hypertension.   ·  Plan: hold amlodipine for now  bp is stable    Problem/Plan - 6:  ·  Problem: Alzheimer's dementia.   ·  Plan: c/w memantine and Donepezil    Problem/Plan - 7:  ·  Problem: Anemia  -per documentarian by my colleague baseline hgb 12.0  on admission the patient was volume contracted and hgb was 15.  His current hgb of 11 likely represents his normal baseline. Doubt acute blood loss    thrombocytopenia improving   - dc heparin and trending up   - hit neg          Need for prophylactic measure.   ·  Plan: diet: pureed with thickened liquids   dvt ppx: heparin subq  dispo: stable for dc to [facility]. palliative consult done and discussed GOC with family. remains full code. understands

## 2022-10-15 NOTE — PROGRESS NOTE ADULT - SUBJECTIVE AND OBJECTIVE BOX
Patient is a 87y old  Male who presents with a chief complaint of decrease po intake (14 Oct 2022 11:32)    HPI:  88 y/o M pmhx HTN, HLD, DM, alzheimer, dementia sent from [facility] for worsening responsiveness. Spoke with wife (divine) and daughter. Patient is normally A&Ox0, minimally responsive and needs help with all his ADLS. Today, patient was not responsive at all, and has been eating less and less of his food for the past few days. Family was told that he has had 50-60% of his food for the past few days and that he has been continuously losing weight. Otherwise, there are no reported fevers, chills, nausea, vomiting episodes. ROS is otherwise unattainable from patient, does not respond to verbal commands, but does respond to painful stimuli (positive nail-bed test).  (03 Oct 2022 21:28)    SUBJECTIVE & OBJECTIVE: Pt seen and examined at bedside.   PHYSICAL EXAM:  T(C): 36.9 (10-15-22 @ 05:59), Max: 36.9 (10-15-22 @ 05:59)  HR: 85 (10-15-22 @ 05:59) (85 - 94)  BP: 107/71 (10-15-22 @ 05:59) (107/71 - 125/76)  RR: 18 (10-15-22 @ 05:59) (18 - 19)  SpO2: 96% (10-15-22 @ 05:59) (96% - 99%) Daily     Daily I&O's Detail    14 Oct 2022 07:01  -  15 Oct 2022 07:00  --------------------------------------------------------  IN:    Oral Fluid: 300 mL  Total IN: 300 mL    OUT:  Total OUT: 0 mL    Total NET: 300 mL        GENERAL: thin and chronically ill appearing   HEAD:  Atraumatic, Normocephalic  EYES: EOMI, PERRLA, conjunctiva and sclera clear  ENMT: Moist mucous membranes  NECK: Supple, No JVD  NERVOUS SYSTEM: not alert, not following commands   CHEST/LUNG: Clear to auscultation bilaterally; No rales, rhonchi, wheezing, or rubs  HEART: Regular rate and rhythm; No murmurs, rubs, or gallops  ABDOMEN: Soft, Nontender, Nondistended; Bowel sounds present  EXTREMITIES:  2+ Peripheral Pulses, No clubbing, cyanosis, or edema  LABS:                        11.1   7.24  )-----------( 143      ( 13 Oct 2022 10:00 )             35.3   CAPILLARY BLOOD GLUCOSE        RECENT CULTURES:   RADIOLOGY & ADDITIONAL TESTS:

## 2022-10-16 PROCEDURE — 99232 SBSQ HOSP IP/OBS MODERATE 35: CPT

## 2022-10-16 RX ADMIN — DONEPEZIL HYDROCHLORIDE 10 MILLIGRAM(S): 10 TABLET, FILM COATED ORAL at 22:48

## 2022-10-16 RX ADMIN — Medication 1 TABLET(S): at 12:41

## 2022-10-16 RX ADMIN — MEMANTINE HYDROCHLORIDE 10 MILLIGRAM(S): 10 TABLET ORAL at 05:54

## 2022-10-16 RX ADMIN — SIMVASTATIN 20 MILLIGRAM(S): 20 TABLET, FILM COATED ORAL at 22:48

## 2022-10-16 RX ADMIN — PANTOPRAZOLE SODIUM 40 MILLIGRAM(S): 20 TABLET, DELAYED RELEASE ORAL at 12:41

## 2022-10-16 RX ADMIN — MEMANTINE HYDROCHLORIDE 10 MILLIGRAM(S): 10 TABLET ORAL at 18:12

## 2022-10-16 RX ADMIN — SODIUM CHLORIDE 75 MILLILITER(S): 9 INJECTION, SOLUTION INTRAVENOUS at 05:56

## 2022-10-16 RX ADMIN — TAMSULOSIN HYDROCHLORIDE 0.4 MILLIGRAM(S): 0.4 CAPSULE ORAL at 22:49

## 2022-10-16 RX ADMIN — Medication 1 TABLET(S): at 12:42

## 2022-10-16 NOTE — PROGRESS NOTE ADULT - ASSESSMENT
86 y/o M pmhx HTN, HLD, DM, alzheimer's dementia, recent COVID [facility], found to have UTI and severe dehydration.       d/c on monday when bed available at LTC center      Problem/Plan - 1:  ·  Problem: Acute cystitis.   ·  Plan: UA + with leuk esterases and wbcs  completed  ceftriaxone   -urine culture is growing > 3 organism.  repeat via straight cath growing candida but but remains stable off abx and antifungals. per my colleague's documenatation he discussed with Dr. Leroy and no need to treat       Problem/Plan - 2:  ·  Problem: RADHA (acute kidney injury).    presumed prerenal and  dehydration showing improvements with IVF that my colleague restarted     Problem/Plan - 3:  ·  Problem: Functional Quadriplegia  ·  Plan: weight loss, decreased po intake   - speech and swallow done  - patient may have supervised feeds Puree with thickened liquids    my colleague informed me he d/w family regarding peg and they did not make any decisions  - my colleague discussed patient's prognosis with family, they would like patient to be d/c to LTC when ready     Problem/Plan - 4:  ·  Problem: Hypernatremia improved       Problem/Plan - 5:  ·  Problem: Hypertension.   ·  Plan: hold amlodipine for now  bp is stable    Problem/Plan - 6:  ·  Problem: Alzheimer's dementia.   ·  Plan: c/w memantine and Donepezil    Problem/Plan - 7:  ·  Problem: Anemia  -per documentarian by my colleague baseline hgb 12.0  on admission the patient was volume contracted and hgb was 15.  His current hgb of 11 likely represents his normal baseline. Doubt acute blood loss    thrombocytopenia improving   - dc heparin and trending up   - hit neg          Need for prophylactic measure.   ·  Plan: diet: pureed with thickened liquids   dvt ppx: heparin subq  dispo: stable for dc to [facility]. palliative consult done and discussed GOC with family. remains full code. understands

## 2022-10-16 NOTE — PROGRESS NOTE ADULT - SUBJECTIVE AND OBJECTIVE BOX
Patient is a 87y old  Male who presents with a chief complaint of decrease po intake (15 Oct 2022 08:38)    HPI:  86 y/o M pmhx HTN, HLD, DM, alzheimer, dementia sent from [facility] for worsening responsiveness. Spoke with wife (divine) and daughter. Patient is normally A&Ox0, minimally responsive and needs help with all his ADLS. Today, patient was not responsive at all, and has been eating less and less of his food for the past few days. Family was told that he has had 50-60% of his food for the past few days and that he has been continuously losing weight. Otherwise, there are no reported fevers, chills, nausea, vomiting episodes. ROS is otherwise unattainable from patient, does not respond to verbal commands, but does respond to painful stimuli (positive nail-bed test).  (03 Oct 2022 21:28)    SUBJECTIVE & OBJECTIVE: Pt seen and examined at bedside.   PHYSICAL EXAM:  T(C): 37.2 (10-17-22 @ 05:08), Max: 37.6 (10-16-22 @ 23:29)  HR: 82 (10-17-22 @ 05:08) (61 - 82)  BP: 111/63 (10-17-22 @ 05:08) (102/68 - 111/63)  RR: 18 (10-17-22 @ 05:08) (17 - 19)  SpO2: 96% (10-17-22 @ 05:08) (95% - 100%) Daily     Daily I&O's Detail    15 Oct 2022 07:01  -  16 Oct 2022 07:00  --------------------------------------------------------  IN:    dextrose 5%: 750 mL    Oral Fluid: 720 mL  Total IN: 1470 mL    OUT:    Voided (mL): 5 mL  Total OUT: 5 mL    Total NET: 1465 mL      16 Oct 2022 07:01  -  17 Oct 2022 06:32  --------------------------------------------------------  IN:    dextrose 5%: 900 mL  Total IN: 900 mL    OUT:  Total OUT: 0 mL    Total NET: 900 mL        GENERAL: non verbal chronically ill appearing  HEAD:  Atraumatic, Normocephalic  EYES: EOMI, PERRLA, conjunctiva and sclera clear  ENMT: Moist mucous membranes  NECK: Supple, No JVD  NERVOUS SYSTEM:  non verbal, moving all extremities   CHEST/LUNG: Clear to auscultation bilaterally; No rales, rhonchi, wheezing, or rubs  HEART: Regular rate and rhythm; No murmurs, rubs, or gallops  ABDOMEN: Soft, Nontender, Nondistended; Bowel sounds present  EXTREMITIES:  2+ Peripheral Pulses, No clubbing, cyanosis, or edema  LABS:  CAPILLARY BLOOD GLUCOSE        RECENT CULTURES:   RADIOLOGY & ADDITIONAL TESTS:

## 2022-10-17 PROCEDURE — 99232 SBSQ HOSP IP/OBS MODERATE 35: CPT

## 2022-10-17 RX ADMIN — SODIUM CHLORIDE 75 MILLILITER(S): 9 INJECTION, SOLUTION INTRAVENOUS at 05:08

## 2022-10-17 RX ADMIN — Medication 1 TABLET(S): at 12:18

## 2022-10-17 RX ADMIN — Medication 3 MILLIGRAM(S): at 21:16

## 2022-10-17 RX ADMIN — TAMSULOSIN HYDROCHLORIDE 0.4 MILLIGRAM(S): 0.4 CAPSULE ORAL at 21:17

## 2022-10-17 RX ADMIN — SODIUM CHLORIDE 75 MILLILITER(S): 9 INJECTION, SOLUTION INTRAVENOUS at 20:49

## 2022-10-17 RX ADMIN — MEMANTINE HYDROCHLORIDE 10 MILLIGRAM(S): 10 TABLET ORAL at 05:08

## 2022-10-17 RX ADMIN — SIMVASTATIN 20 MILLIGRAM(S): 20 TABLET, FILM COATED ORAL at 21:17

## 2022-10-17 RX ADMIN — PANTOPRAZOLE SODIUM 40 MILLIGRAM(S): 20 TABLET, DELAYED RELEASE ORAL at 12:17

## 2022-10-17 RX ADMIN — DONEPEZIL HYDROCHLORIDE 10 MILLIGRAM(S): 10 TABLET, FILM COATED ORAL at 21:17

## 2022-10-17 RX ADMIN — MEMANTINE HYDROCHLORIDE 10 MILLIGRAM(S): 10 TABLET ORAL at 18:52

## 2022-10-17 NOTE — PROGRESS NOTE ADULT - ASSESSMENT
86 y/o M pmhx HTN, HLD, DM, alzheimer's dementia, recent COVID sent from [facility], found to have UTI and severe dehydration.       d/c on monday when bed available at LTC center      Problem/Plan - 1:  ·  Problem: Acute cystitis.   ·  Plan: UA + with leuk esterases and wbcs  completed  ceftriaxone   -urine culture is growing > 3 organism.  repeat via straight cath growing candida but but remains stable off abx and antifungals. per my colleague's documenatation he discussed with Dr. Leroy and no need to treat       Problem/Plan - 2:  ·  Problem: RADHA (acute kidney injury).    presumed prerenal and  dehydration showing improvements with IVF that my colleague restarted     Problem/Plan - 3:  ·  Problem: Functional Quadriplegia  ·  Plan: weight loss, decreased po intake   - speech and swallow done  - patient may have supervised feeds Puree with thickened liquids    my colleague informed me he d/w family regarding peg and they did not make any decisions  - my colleague discussed patient's prognosis with family, they would like patient to be d/c to LTC when ready     Problem/Plan - 4:  ·  Problem: Hypernatremia improved       Problem/Plan - 5:  ·  Problem: Hypertension.   ·  Plan: hold amlodipine for now  bp is stable    Problem/Plan - 6:  ·  Problem: Alzheimer's dementia.   ·  Plan: c/w memantine and Donepezil    Problem/Plan - 7:  ·  Problem: Anemia  -per documentarian by my colleague baseline hgb 12.0  on admission the patient was volume contracted and hgb was 15.  His current hgb of 11 likely represents his normal baseline. Doubt acute blood loss    thrombocytopenia improving   - dc heparin and trending up   - hit neg          Need for prophylactic measure.   ·  Plan: diet: pureed with thickened liquids   dvt ppx: heparin subq  dispo: stable for dc to [facility]. palliative consult done and discussed GOC with family. remains full code. understands

## 2022-10-17 NOTE — PROGRESS NOTE ADULT - SUBJECTIVE AND OBJECTIVE BOX
Patient is a 87y old  Male who presents with a chief complaint of decrease po intake (16 Oct 2022 09:31)    HPI:  86 y/o M pmhx HTN, HLD, DM, alzheimer, dementia sent from [facility] for worsening responsiveness. Spoke with wife (divine) and daughter. Patient is normally A&Ox0, minimally responsive and needs help with all his ADLS. Today, patient was not responsive at all, and has been eating less and less of his food for the past few days. Family was told that he has had 50-60% of his food for the past few days and that he has been continuously losing weight. Otherwise, there are no reported fevers, chills, nausea, vomiting episodes. ROS is otherwise unattainable from patient, does not respond to verbal commands, but does respond to painful stimuli (positive nail-bed test).  (03 Oct 2022 21:28)    SUBJECTIVE & OBJECTIVE: Pt seen and examined at bedside.   PHYSICAL EXAM:  T(C): 36.4 (10-17-22 @ 11:06), Max: 37.6 (10-16-22 @ 23:29)  HR: 80 (10-17-22 @ 11:06) (61 - 82)  BP: 103/70 (10-17-22 @ 11:06) (103/70 - 111/63)  RR: 19 (10-17-22 @ 11:06) (17 - 19)  SpO2: 100% (10-17-22 @ 11:06) (96% - 100%) Daily     Daily I&O's Detail    16 Oct 2022 07:01  -  17 Oct 2022 07:00  --------------------------------------------------------  IN:    dextrose 5%: 1670 mL    Oral Fluid: 440 mL  Total IN: 2110 mL    OUT:  Total OUT: 0 mL    Total NET: 2110 mL        GENERAL: thin and chronically ill appearing   HEAD:  Atraumatic, Normocephalic  EYES: EOMI, PERRLA, conjunctiva and sclera clear  ENMT: Moist mucous membranes  NECK: Supple, No JVD  NERVOUS SYSTEM:  alert but not following commands.   CHEST/LUNG: Clear to auscultation bilaterally; No rales, rhonchi, wheezing, or rubs  HEART: Regular rate and rhythm; No murmurs, rubs, or gallops  ABDOMEN: Soft, Nontender, Nondistended; Bowel sounds present  EXTREMITIES:  2+ Peripheral Pulses, No clubbing, cyanosis, or edema  LABS:  CAPILLARY BLOOD GLUCOSE        RECENT CULTURES:   RADIOLOGY & ADDITIONAL TESTS:

## 2022-10-18 PROCEDURE — 99232 SBSQ HOSP IP/OBS MODERATE 35: CPT

## 2022-10-18 RX ADMIN — SODIUM CHLORIDE 75 MILLILITER(S): 9 INJECTION, SOLUTION INTRAVENOUS at 05:32

## 2022-10-18 RX ADMIN — MEMANTINE HYDROCHLORIDE 10 MILLIGRAM(S): 10 TABLET ORAL at 05:33

## 2022-10-18 RX ADMIN — SODIUM CHLORIDE 75 MILLILITER(S): 9 INJECTION, SOLUTION INTRAVENOUS at 21:27

## 2022-10-18 RX ADMIN — MEMANTINE HYDROCHLORIDE 10 MILLIGRAM(S): 10 TABLET ORAL at 18:34

## 2022-10-18 RX ADMIN — DONEPEZIL HYDROCHLORIDE 10 MILLIGRAM(S): 10 TABLET, FILM COATED ORAL at 21:26

## 2022-10-18 RX ADMIN — Medication 1 TABLET(S): at 12:30

## 2022-10-18 RX ADMIN — TAMSULOSIN HYDROCHLORIDE 0.4 MILLIGRAM(S): 0.4 CAPSULE ORAL at 21:27

## 2022-10-18 RX ADMIN — PANTOPRAZOLE SODIUM 40 MILLIGRAM(S): 20 TABLET, DELAYED RELEASE ORAL at 12:30

## 2022-10-18 RX ADMIN — SIMVASTATIN 20 MILLIGRAM(S): 20 TABLET, FILM COATED ORAL at 21:26

## 2022-10-18 NOTE — PROGRESS NOTE ADULT - SUBJECTIVE AND OBJECTIVE BOX
Patient is a 87y old  Male who presents with a chief complaint of decrease po intake (17 Oct 2022 12:47)    HPI:  86 y/o M pmhx HTN, HLD, DM, alzheimer, dementia sent from [facility] for worsening responsiveness. Spoke with wife (divine) and daughter. Patient is normally A&Ox0, minimally responsive and needs help with all his ADLS. Today, patient was not responsive at all, and has been eating less and less of his food for the past few days. Family was told that he has had 50-60% of his food for the past few days and that he has been continuously losing weight. Otherwise, there are no reported fevers, chills, nausea, vomiting episodes. ROS is otherwise unattainable from patient, does not respond to verbal commands, but does respond to painful stimuli (positive nail-bed test).  (03 Oct 2022 21:28)    SUBJECTIVE & OBJECTIVE: Pt seen and examined at bedside. Afebrile and doing well   PHYSICAL EXAM:  T(C): 36.9 (10-18-22 @ 11:18), Max: 36.9 (10-18-22 @ 11:18)  HR: 73 (10-18-22 @ 05:05) (73 - 87)  BP: 122/67 (10-18-22 @ 11:18) (101/62 - 122/67)  RR: 17 (10-18-22 @ 11:18) (17 - 18)  SpO2: 97% (10-18-22 @ 11:18) (96% - 97%) Daily     Daily I&O's Detail    17 Oct 2022 07:01  -  18 Oct 2022 07:00  --------------------------------------------------------  IN:    Oral Fluid: 440 mL  Total IN: 440 mL    OUT:  Total OUT: 0 mL    Total NET: 440 mL        GENERAL: thin and chronically ill appearing   HEAD:  Atraumatic, Normocephalic  EYES: EOMI, PERRLA, conjunctiva and sclera clear  ENMT: Moist mucous membranes  NECK: Supple, No JVD  NERVOUS SYSTEM:  Alert & confused, Motor Strength 5/5 B/L upper and lower extremities; DTRs 2+ intact and symmetric  CHEST/LUNG: Clear to auscultation bilaterally; No rales, rhonchi, wheezing, or rubs  HEART: Regular rate and rhythm; No murmurs, rubs, or gallops  ABDOMEN: Soft, Nontender, Nondistended; Bowel sounds present  EXTREMITIES:  2+ Peripheral Pulses, No clubbing, cyanosis, or edema  LABS:  CAPILLARY BLOOD GLUCOSE        RECENT CULTURES:   RADIOLOGY & ADDITIONAL TESTS:

## 2022-10-18 NOTE — CHART NOTE - NSCHARTNOTEFT_GEN_A_CORE
Pt admitted from [facility] for worsening responsiveness, decreased PO intake, continued wt loss; found c UTI, severe dehydration, RADHA, hyponatremia, hypokalemia, anemia. Pt is dependent for all ADLs; recent COVID illness.  PMHx includes dementia, HTN, HLD, T2DM.  Pt remains full code; pt has been accepted to LTC facility; awaiting bed.       Factors impacting intake: [ ] none [ ] nausea  [ ] vomiting [ ] diarrhea [ ] constipation  [ ]chewing problems [ ] swallowing issues  [ ] other:     Diet Prescription: Diet, Pureed:   Moderately Thick Liquids (MODTHICKLIQS)  Supplement Feeding Modality:  Oral  Ensure Pudding Cans or Servings Per Day:  1       Frequency:  Two Times a day (10-06-22 @ 13:38)    Intake: intake has improved; consuming 50-75% most meals c assistance; drinking some of supplement; CNA reports pt eats best at breakfast    Current Weight:   62.5 kg (10/13); admission wt 65.8 kg (10/3)  % Weight Change:   5% wt loss x 10 days    No edema noted    Pertinent Medications: MEDICATIONS  (STANDING):  dextrose 5%. 1000 milliLiter(s) (75 mL/Hr) IV Continuous <Continuous>  donepezil 10 milliGRAM(s) Oral at bedtime  lactobacillus acidophilus 1 Tablet(s) Oral daily  memantine 10 milliGRAM(s) Oral two times a day  multivitamin 1 Tablet(s) Oral daily  pantoprazole  Injectable 40 milliGRAM(s) IV Push daily  simvastatin 20 milliGRAM(s) Oral at bedtime  tamsulosin 0.4 milliGRAM(s) Oral at bedtime    MEDICATIONS  (PRN):  melatonin 3 milliGRAM(s) Oral at bedtime PRN Insomnia    Pertinent Labs:  10-13 Phos 2.0 mg/dL<L>    Skin: no pressure ulcers noted    Estimated Needs:   [x ] no change since previous assessment (10/06)  [ ] recalculated:     Previous Nutrition Diagnosis:   [X ]  Severe Malnutrition in context of chronic illness  Etiology:  Inadequate energy/protein intake related to advancing dementia  Signs & Symptoms:  physical findings of severe muscle wasting as noted on 10/14; pt consuming <75% nutrition needs >1 month; 5% wt loss x 10 days    GOAL:  Provide nutrition/hydration as medically appropriate, as tolerated, & within family's wishes for tx    Nutrition Diagnosis is [X ] ongoing  [ ] resolved [ ] not applicable     New Nutrition Diagnosis: [x ] not applicable      Interventions:   continue current diet rx as noted  Recommend  [ ] Change Diet To:  [ ] Nutrition Supplement  [ ] Nutrition Support  [ ] Other:     Monitoring and Evaluation:   [x ] PO intake [ x ] Tolerance to diet prescription [ x ] weights [ x ] labs[ x ] follow up per protocol  [ ] other:

## 2022-10-18 NOTE — PROGRESS NOTE ADULT - ASSESSMENT
88 y/o M pmhx HTN, HLD, DM, alzheimer's dementia, recent COVID sent from [facility] for worsening responsiveness, found to have UTI and severe dehydration.       d/c on monday when bed available at LTC center      Problem/Plan - 1:  ·  Problem: Acute cystitis.   ·  Plan: UA + with leuk esterases and wbcs  completed  ceftriaxone   -urine culture is growing > 3 organism.  repeat via straight cath growing candida but but remains stable off abx and antifungals. per my colleague's documenatation he discussed with Dr. Leroy and no need to treat       Problem/Plan - 2:  ·  Problem: RADHA (acute kidney injury).    presumed prerenal and  dehydration showing improvements with IVF that my colleague restarted     Problem/Plan - 3:  ·  Problem: Functional Quadriplegia  ·  Plan: weight loss, decreased po intake   - speech and swallow done  - patient may have supervised feeds Puree with thickened liquids    my colleague informed me he d/w family regarding peg and they did not make any decisions  - my colleague discussed patient's prognosis with family, they would like patient to be d/c to LTC when ready     Problem/Plan - 4:  ·  Problem: Hypernatremia improved       Problem/Plan - 5:  ·  Problem: Hypertension.   ·  Plan: hold amlodipine for now  bp is stable    Problem/Plan - 6:  ·  Problem: Alzheimer's dementia.   ·  Plan: c/w memantine and Donepezil    Problem/Plan - 7:  ·  Problem: Anemia  -per documentarian by my colleague baseline hgb 12.0  on admission the patient was volume contracted and hgb was 15.  His current hgb of 11 likely represents his normal baseline. Doubt acute blood loss    thrombocytopenia improving   - dc heparin and trending up   - hit neg          Need for prophylactic measure.   ·  Plan: diet: pureed with thickened liquids   dvt ppx: heparin subq  dispo: stable for dc to [facility]. palliative consult done and discussed GOC with family. remains full code. understands

## 2022-10-19 ENCOUNTER — TRANSCRIPTION ENCOUNTER (OUTPATIENT)
Age: 87
End: 2022-10-19

## 2022-10-19 VITALS
SYSTOLIC BLOOD PRESSURE: 111 MMHG | DIASTOLIC BLOOD PRESSURE: 69 MMHG | HEART RATE: 80 BPM | RESPIRATION RATE: 18 BRPM | TEMPERATURE: 98 F | OXYGEN SATURATION: 98 %

## 2022-10-19 PROCEDURE — 99239 HOSP IP/OBS DSCHRG MGMT >30: CPT

## 2022-10-19 RX ORDER — LANOLIN ALCOHOL/MO/W.PET/CERES
1 CREAM (GRAM) TOPICAL
Qty: 0 | Refills: 0 | DISCHARGE
Start: 2022-10-19

## 2022-10-19 RX ADMIN — MEMANTINE HYDROCHLORIDE 10 MILLIGRAM(S): 10 TABLET ORAL at 05:38

## 2022-10-19 RX ADMIN — Medication 1 TABLET(S): at 12:24

## 2022-10-19 RX ADMIN — PANTOPRAZOLE SODIUM 40 MILLIGRAM(S): 20 TABLET, DELAYED RELEASE ORAL at 12:24

## 2022-10-19 NOTE — PROGRESS NOTE ADULT - REASON FOR ADMISSION
decrease po intake

## 2022-10-19 NOTE — DISCHARGE NOTE PROVIDER - DETAILS OF MALNUTRITION DIAGNOSIS/DIAGNOSES
This patient has been assessed with a concern for Malnutrition and was treated during this hospitalization for the following Nutrition diagnosis/diagnoses:     -  10/06/2022: Severe protein-calorie malnutrition

## 2022-10-19 NOTE — PROGRESS NOTE ADULT - SUBJECTIVE AND OBJECTIVE BOX
)    HPI:  86 y/o M pmhx HTN, HLD, DM, alzheimer, dementia sent from [facility] for worsening responsiveness. Spoke with wife (divine) and daughter. Patient is normally A&Ox0, minimally responsive and needs help with all his ADLS. Today, patient was not responsive at all, and has been eating less and less of his food for the past few days. Family was told that he has had 50-60% of his food for the past few days and that he has been continuously losing weight. Otherwise, there are no reported fevers, chills, nausea, vomiting episodes. ROS is otherwise unattainable from patient, does not respond to verbal commands, but does respond to painful stimuli (positive nail-bed test).  (03 Oct 2022 21:28)    SUBJECTIVE & OBJECTIVE: Pt seen and examined at bedside. Afebrile and doing well     PHYSICAL EXAM:    Vital Signs Last 24 Hrs  T(C): 36.7 (19 Oct 2022 12:41), Max: 37.7 (18 Oct 2022 23:37)  T(F): 98 (19 Oct 2022 12:41), Max: 99.9 (18 Oct 2022 23:37)  HR: 76 (19 Oct 2022 12:41) (76 - 98)  BP: 107/67 (19 Oct 2022 12:41) (107/67 - 116/66)  BP(mean): --  RR: 17 (19 Oct 2022 12:41) (17 - 18)  SpO2: 98% (19 Oct 2022 12:41) (96% - 100%)    Parameters below as of 19 Oct 2022 12:41  Patient On (Oxygen Delivery Method): room air            GENERAL: thin and chronically ill appearing   HEAD:  Atraumatic, Normocephalic  EYES: EOMI, PERRLA, conjunctiva and sclera clear  ENMT: Moist mucous membranes  NECK: Supple, No JVD  NERVOUS SYSTEM:  Alert & confused, Motor Strength 5/5 B/L upper and lower extremities; DTRs 2+ intact and symmetric  CHEST/LUNG: Clear to auscultation bilaterally; No rales, rhonchi, wheezing, or rubs  HEART: Regular rate and rhythm; No murmurs, rubs, or gallops  ABDOMEN: Soft, Nontender, Nondistended; Bowel sounds present  EXTREMITIES:  2+ Peripheral Pulses, No clubbing, cyanosis, or edema  LABS:  CAPILLARY BLOOD GLUCOSE        RECENT CULTURES:   RADIOLOGY & ADDITIONAL TESTS:

## 2022-10-19 NOTE — DISCHARGE NOTE PROVIDER - HOSPITAL COURSE
88 y/o M pmhx HTN, HLD, DM, alzheimer, dementia sent from [facility] for worsening responsiveness. Spoke with wife (divine) and daughter. Patient is normally A&Ox0, minimally responsive and needs help with all his ADLS. Today, patient was not responsive at all, and has been eating less and less of his food for the past few days. Family was told that he has had 50-60% of his food for the past few days and that he has been continuously losing weight. Otherwise, there are no reported fevers, chills, nausea, vomiting episodes. ROS is otherwise unattainable from patient, does not respond to verbal commands, but does respond to painful stimuli (positive nail-bed test).  (03 Oct 2022 21:28)88 y/o M pmhx HTN, HLD, DM, alzheimer's dementia, recent COVID [facility], found to have UTI and severe dehydration.   d/c on Monday when bed available at LTC center  1: ·  Problem: Acute cystitis.   ·  Plan: UA + with leuk esterases and wbcs  completed  ceftriaxone   -urine culture is growing > 3 organism.  repeat via straight cath growing candida but but remains stable off abx and antifungals. per my colleague's documenatation he discussed with Dr. Leroy and no need to treat   2: ·  Problem: RADHA (acute kidney injury).    presumed prerenal and  dehydration showing improvements with IVF that my colleague restarted   3: ·  Problem: Functional Quadriplegia  ·  Plan: weight loss, decreased po intake   - speech and swallow done  - patient may have supervised feeds Puree with thickened liquids    my colleague informed me he d/w family regarding peg and they did not make any decisions  - my colleague discussed patient's prognosis with family, they would like patient to be d/c to LTC when ready   4: ·  Problem: Hypernatremia improved   5: ·  Problem: Hypertension.   ·  Plan: hold amlodipine for now  bp is stable  6: ·  Problem: Alzheimer's dementia.   ·  Plan: c/w memantine and Donepezil  7: ·  Problem: Anemia  -per documentarian by my colleague baseline hgb 12.0  on admission the patient was volume contracted and hgb was 15.  His current hgb of 11 likely represents his normal baseline. Doubt acute blood loss  thrombocytopenia improving   - dc heparin and trending up   - hit neg    Need for prophylactic measure.   ·  Plan: diet: pureed with thickened liquids   dvt ppx: heparin subq  dispo: stable for dc to [facility]. palliative consult done and discussed GOC with family. remains full code.

## 2022-10-19 NOTE — DISCHARGE NOTE PROVIDER - NSDCMRMEDTOKEN_GEN_ALL_CORE_FT
donepezil 10 mg oral tablet: 1 tab(s) orally once a day (at bedtime)  lactobacillus acidophilus oral capsule: 1 tab(s) orally 2 times a day  melatonin 3 mg oral tablet: 1 tab(s) orally once a day (at bedtime), As needed, Insomnia  memantine 10 mg oral tablet: 1 tab(s) orally 2 times a day  Multiple Vitamins oral tablet: 1 tab(s) orally once a day  pantoprazole 40 mg oral delayed release tablet: 1 tab(s) orally once a day (before a meal)  simvastatin 20 mg oral tablet: 1 tab(s) orally once a day (at bedtime)  tamsulosin 0.4 mg oral capsule: 1 cap(s) orally once a day (at bedtime)

## 2022-10-19 NOTE — DISCHARGE NOTE PROVIDER - NSDCCPCAREPLAN_GEN_ALL_CORE_FT
PRINCIPAL DISCHARGE DIAGNOSIS  Diagnosis: Dehydration  Assessment and Plan of Treatment: RESOLVED DISCHARGED TO LTC      SECONDARY DISCHARGE DIAGNOSES  Diagnosis: Acute UTI  Assessment and Plan of Treatment: RESOLVED

## 2022-10-19 NOTE — DISCHARGE NOTE NURSING/CASE MANAGEMENT/SOCIAL WORK - NSDCPEFALRISK_GEN_ALL_CORE
For information on Fall & Injury Prevention, visit: [email or web address] OR  [email or web address] OR  https://www.cdc.gov/steadi/patient.html

## 2022-10-19 NOTE — PROGRESS NOTE ADULT - NUTRITIONAL ASSESSMENT
This patient has been assessed with a concern for Malnutrition and has been determined to have a diagnosis/diagnoses of Severe protein-calorie malnutrition.    This patient is being managed with:   Diet Pureed-  Moderately Thick Liquids (MODTHICKLIQS)  Supplement Feeding Modality:  Oral  Ensure Pudding Cans or Servings Per Day:  1       Frequency:  Two Times a day  Entered: Oct  6 2022  1:38PM    

## 2022-10-19 NOTE — DISCHARGE NOTE NURSING/CASE MANAGEMENT/SOCIAL WORK - PATIENT PORTAL LINK FT
You can access the FollowMyHealth Patient Portal offered by [facility] by registering at the following website: [email or web address]. By joining [facility]’s FollowMyHealth portal, you will also be able to view your health information using other applications (apps) compatible with our system.

## 2022-10-28 DIAGNOSIS — E87.6 HYPOKALEMIA: ICD-10-CM

## 2022-10-28 DIAGNOSIS — R64 CACHEXIA: ICD-10-CM

## 2022-10-28 DIAGNOSIS — Z86.16 PERSONAL HISTORY OF COVID-19: ICD-10-CM

## 2022-10-28 DIAGNOSIS — D69.6 THROMBOCYTOPENIA, UNSPECIFIED: ICD-10-CM

## 2022-10-28 DIAGNOSIS — F02.80 DEMENTIA IN OTHER DISEASES CLASSIFIED ELSEWHERE, UNSPECIFIED SEVERITY, WITHOUT BEHAVIORAL DISTURBANCE, PSYCHOTIC DISTURBANCE, MOOD DISTURBANCE, AND ANXIETY: ICD-10-CM

## 2022-10-28 DIAGNOSIS — E78.5 HYPERLIPIDEMIA, UNSPECIFIED: ICD-10-CM

## 2022-10-28 DIAGNOSIS — E87.0 HYPEROSMOLALITY AND HYPERNATREMIA: ICD-10-CM

## 2022-10-28 DIAGNOSIS — G30.9 ALZHEIMER'S DISEASE, UNSPECIFIED: ICD-10-CM

## 2022-10-28 DIAGNOSIS — E86.0 DEHYDRATION: ICD-10-CM

## 2022-10-28 DIAGNOSIS — I10 ESSENTIAL (PRIMARY) HYPERTENSION: ICD-10-CM

## 2022-10-28 DIAGNOSIS — N17.9 ACUTE KIDNEY FAILURE, UNSPECIFIED: ICD-10-CM

## 2022-10-28 DIAGNOSIS — N30.00 ACUTE CYSTITIS WITHOUT HEMATURIA: ICD-10-CM

## 2022-10-28 DIAGNOSIS — R53.2 FUNCTIONAL QUADRIPLEGIA: ICD-10-CM

## 2022-10-28 DIAGNOSIS — R41.82 ALTERED MENTAL STATUS, UNSPECIFIED: ICD-10-CM

## 2022-10-28 DIAGNOSIS — R62.7 ADULT FAILURE TO THRIVE: ICD-10-CM

## 2022-10-28 DIAGNOSIS — E11.9 TYPE 2 DIABETES MELLITUS WITHOUT COMPLICATIONS: ICD-10-CM

## 2022-10-28 DIAGNOSIS — E43 UNSPECIFIED SEVERE PROTEIN-CALORIE MALNUTRITION: ICD-10-CM

## 2022-10-28 DIAGNOSIS — D64.9 ANEMIA, UNSPECIFIED: ICD-10-CM

## 2024-02-12 NOTE — ED ADULT TRIAGE NOTE - HEIGHT IN INCHES
11
Quality 226: Preventive Care And Screening: Tobacco Use: Screening And Cessation Intervention: Patient screened for tobacco use and is an ex/non-smoker
Detail Level: Detailed
Quality 431: Preventive Care And Screening: Unhealthy Alcohol Use - Screening: Patient not identified as an unhealthy alcohol user when screened for unhealthy alcohol use using a systematic screening method

## 2024-03-21 NOTE — PROGRESS NOTE ADULT - ASSESSMENT
88 y/o M pmhx HTN, HLD, DM, alzheimer's dementia, recent COVID sent from [facility], found to have UTI and severe dehydration.       Problem/Plan - 1:  ·  Problem: Acute cystitis.   ·  Plan: UA + with leuk esterases and wbcs  completed  ceftriaxone   -urine culture is growing > 3 organism.  repeat via straight cath growing candida but but remains stable off abx and antifungals. per my colleague's documenatation he discussed with Dr. Leroy and no need to treat       Problem/Plan - 2:  ·  Problem: RADHA (acute kidney injury).    presumed prerenal and  dehydration showing improvements with IVF that my colleague restarted     Problem/Plan - 3:  ·  Problem: Functional Quadriplegia  ·  Plan: weight loss, decreased po intake   - speech and swallow done  - patient may have supervised feeds Puree with thickened liquids    my colleague informed me he d/w family regarding peg and they did not make any decisions  - my colleague discussed patient's prognosis with family, they would like patient to be d/c to LTC when ready     Problem/Plan - 4:  ·  Problem: Hypernatremia improved       Problem/Plan - 5:  ·  Problem: Hypertension.   ·  Plan: hold amlodipine for now  bp is stable    Problem/Plan - 6:  ·  Problem: Alzheimer's dementia.   ·  Plan: c/w memantine and Donepezil    Problem/Plan - 7:  ·  Problem: Anemia  -per documentarian by my colleague baseline hgb 12.0  on admission the patient was volume contracted and hgb was 15.  His current hgb of 11 likely represents his normal baseline. Doubt acute blood loss    thrombocytopenia improving   - dc heparin and trending up   - hit neg          Need for prophylactic measure.   ·  Plan: diet: pureed with thickened liquids   dvt ppx: heparin subq  dispo: stable for dc to [facility]. palliative consult done and discussed GOC with family. remains full code. understands    awaiting placement    No protocol for requested medication.    Medication:   ALPRAZolam (XANAX) 0.25 MG tablet   Take 1 tablet by mouth nightly as needed for Sleep or Anxiety.   Dispense: 30 tablet     Refills: 0     Last office visit date: 03/01/2024  Next scheduled office visit: 04/03/2024    Pharmacy: [id number] - [place], [place] - [phone]     Order pended, routed to clinician for review.

## 2024-04-08 NOTE — PROGRESS NOTE ADULT - SUBJECTIVE AND OBJECTIVE BOX
Patient is a 87y old  Male who presents with a chief complaint of decrease po intake (04 Oct 2022 13:33)    HPI:  86 y/o M pmhx HTN, HLD, DM, alzheimer, dementia sent from [facility] for worsening responsiveness. Spoke with wife (divine) and daughter. Patient is normally A&Ox0, minimally responsive and needs help with all his ADLS. Today, patient was not responsive at all, and has been eating less and less of his food for the past few days. Family was told that he has had 50-60% of his food for the past few days and that he has been continuously losing weight. Otherwise, there are no reported fevers, chills, nausea, vomiting episodes. ROS is otherwise unattainable from patient, does not respond to verbal commands, but does respond to painful stimuli (positive nail-bed test).  (03 Oct 2022 21:28)    SUBJECTIVE & OBJECTIVE: Pt seen and examined at bedside. eating mildly improved     PHYSICAL EXAM:    Vital Signs Last 24 Hrs  T(C): 36.6 (08 Oct 2022 06:20), Max: 37.1 (07 Oct 2022 12:20)  T(F): 97.9 (08 Oct 2022 06:20), Max: 98.7 (07 Oct 2022 12:20)  HR: 80 (08 Oct 2022 06:20) (75 - 89)  BP: 127/74 (08 Oct 2022 06:20) (87/50 - 127/74)  BP(mean): --  RR: 18 (08 Oct 2022 06:20) (17 - 18)  SpO2: 96% (08 Oct 2022 06:20) (96% - 99%)    Parameters below as of 07 Oct 2022 15:26  Patient On (Oxygen Delivery Method): room air          GENERAL: very thin and chronically ill appearing   HEAD:  Atraumatic, Normocephalic  EYES: EOMI, PERRLA, conjunctiva and sclera clear  ENMT: Moist mucous membranes, edentulous  NECK: Supple, No JVD  NERVOUS SYSTEM:  not following commands, Motor Strength 4/5 B/L upper and lower extremities; DTRs 2+ intact and symmetric  CHEST/LUNG: poor air entry bilaterally   HEART: Regular rate and rhythm; No murmurs, rubs, or gallops  ABDOMEN: Soft, Nontender, Nondistended; Bowel sounds present  EXTREMITIES:  2+ Peripheral Pulses, No clubbing, cyanosis, or edema  LABS:                                              x      5.62  )-----------( x        ( 07 Oct 2022 06:25 )             x        10-07    145  |  116<H>  |  31<H>  ----------------------------<  105<H>  3.9   |  23  |  1.36<H>    Ca    8.3<L>      07 Oct 2022 06:25              Ca    8.6      06 Oct 2022 06:10                RECENT CULTURES: blood cultures are pending, urine culture is growing >3 organisms   RADIOLOGY & ADDITIONAL TESTS:   s/p yessica ROSS

## 2024-11-02 NOTE — ED ADULT TRIAGE NOTE - CHIEF COMPLAINT QUOTE
BIBA as per EMS patient having general malaise and cough x 1 week. unable to ambulate x 4 days. Pt alert, disoriented, baseline per family member.   Hx: Alzheimer, Dementia, HTN, DM, HL 0

## 2024-12-02 NOTE — PHYSICAL THERAPY INITIAL EVALUATION ADULT - PASSIVE RANGE OF MOTION EXAMINATION, REHAB EVAL
5 bilateral upper extremity Passive ROM was WFL (within functional limits)/bilateral lower extremity Passive ROM was WFL (within functional limits)

## 2025-03-03 NOTE — PROGRESS NOTE ADULT - PROBLEM SELECTOR PROBLEM 8
Previously Declined (within the last year)
Preventive measure